# Patient Record
Sex: MALE | Race: WHITE | Employment: FULL TIME | URBAN - METROPOLITAN AREA
[De-identification: names, ages, dates, MRNs, and addresses within clinical notes are randomized per-mention and may not be internally consistent; named-entity substitution may affect disease eponyms.]

---

## 2017-03-10 ENCOUNTER — OFFICE VISIT (OUTPATIENT)
Dept: FAMILY MEDICINE | Facility: CLINIC | Age: 57
End: 2017-03-10
Payer: COMMERCIAL

## 2017-03-10 VITALS
HEIGHT: 76 IN | TEMPERATURE: 96.4 F | SYSTOLIC BLOOD PRESSURE: 99 MMHG | DIASTOLIC BLOOD PRESSURE: 55 MMHG | BODY MASS INDEX: 22.16 KG/M2 | WEIGHT: 182 LBS | HEART RATE: 79 BPM

## 2017-03-10 DIAGNOSIS — H90.8 MIXED HEARING LOSS: ICD-10-CM

## 2017-03-10 DIAGNOSIS — M62.40 CONTRACTED, TENDON: Primary | ICD-10-CM

## 2017-03-10 PROCEDURE — 99213 OFFICE O/P EST LOW 20 MIN: CPT | Performed by: FAMILY MEDICINE

## 2017-03-10 NOTE — MR AVS SNAPSHOT
After Visit Summary   3/10/2017    Rhett Valentin    MRN: [id number]           Patient Information     Date Of Birth          1960        Visit Information        Provider Department      3/10/2017 6:40 AM Charles Leija MD [facility]        Today's Diagnoses     Contracted, tendon    -  1      Care Instructions          Thank you for choosing [facility].  You may be receiving a survey in the mail from [employer] regarding your visit today.  Please take a few minutes to complete and return the survey to let us know how we are doing.      If you have questions or concerns, please contact us via uControl or you can contact your care team at [phone].    Our Clinic hours are:  Monday 6:40 am  to 7:00 pm  Tuesday -Friday 6:40 am to 5:00 pm    The [place] outpatient lab hours are:  Monday - Friday 6:10 am to 4:45 pm  Saturdays 7:00 am to 11:00 am  Appointments are required, call [phone]    If you have clinical questions after hours or would like to schedule an appointment,  call the clinic at [phone].        Follow-ups after your visit        Additional Services     ORTHO  REFERRAL       [facility] is referring you to the Orthopedic  Services at [facility].       The  Representative will assist you in the coordination of your Orthopedic and Musculoskeletal Care as prescribed by your physician.    The  Representative will call you within 1 business day to help schedule your appointment, or you may contact the  Representative at:    All areas ~ [phone]     Type of Referral : Surgical / Specialist ( hand surgeon )      Timeframe requested: Routine    Coverage of these services is subject to the terms and limitations of your health insurance plan.  Please call member services at your health plan with any benefit or coverage questions.      If X-rays, CT or MRI's have  "been performed, please contact the facility where they were done to arrange for , prior to your scheduled appointment.  Please bring this referral request to your appointment and present it to your specialist.                  Who to contact     If you have questions or need follow up information about today's clinic visit or your schedule please contact [facility] directly at [phone].  Normal or non-critical lab and imaging results will be communicated to you by MyChart, letter or phone within 4 business days after the clinic has received the results. If you do not hear from us within 7 days, please contact the clinic through [employer] or phone. If you have a critical or abnormal lab result, we will notify you by phone as soon as possible.  Submit refill requests through uberlife or call your pharmacy and they will forward the refill request to us. Please allow 3 business days for your refill to be completed.          Additional Information About Your Visit        uberlife Information     uberlife lets you send messages to your doctor, view your test results, renew your prescriptions, schedule appointments and more. To sign up, go to [email or web address] . Click on \"Log in\" on the left side of the screen, which will take you to the Welcome page. Then click on \"Sign up Now\" on the right side of the page.     You will be asked to enter the access code listed below, as well as some personal information. Please follow the directions to create your username and password.     Your access code is: [email or web address]  Expires: 2017  7:29 AM     Your access code will  in 90 days. If you need help or a new code, please call your [facility] or [phone].        Care EveryWhere ID     This is your Care EveryWhere ID. This could be used by other organizations to access your [place] medical records  [id number]        Your Vitals Were     Pulse Temperature Height BMI (Body Mass Index)       " "   79 96.4  F (35.8  C) (Tympanic) 6' 4\" (1.93 m) 22.15 kg/m2         Blood Pressure from Last 3 Encounters:   03/10/17 99/55   10/27/15 116/72   10/25/15 117/83    Weight from Last 3 Encounters:   03/10/17 182 lb (82.6 kg)   10/27/15 180 lb 3.2 oz (81.7 kg)   10/14/14 182 lb (82.6 kg)              We Performed the Following     ORTHO  REFERRAL        Primary Care Provider    Physician No Ref-Primary       No address on file        Thank you!     Thank you for choosing [facility]  for your care. Our goal is always to provide you with excellent care. Hearing back from our patients is one way we can continue to improve our services. Please take a few minutes to complete the written survey that you may receive in the mail after your visit with us. Thank you!             Your Updated Medication List - Protect others around you: Learn how to safely use, store and throw away your medicines at www.disposemymeds.org.          This list is accurate as of: 3/10/17  7:29 AM.  Always use your most recent med list.                   Brand Name Dispense Instructions for use    amoxicillin-clavulanate 875-125 MG per tablet    AUGMENTIN    20 tablet    Take 1 tablet by mouth 2 times daily         "

## 2017-03-10 NOTE — PATIENT INSTRUCTIONS
Thank you for choosing [facility].  You may be receiving a survey in the mail from Dylan Shelton regarding your visit today.  Please take a few minutes to complete and return the survey to let us know how we are doing.      If you have questions or concerns, please contact us via MtoV or you can contact your care team at [phone].    Our Clinic hours are:  Monday 6:40 am  to 7:00 pm  Tuesday -Friday 6:40 am to 5:00 pm    The [place] outpatient lab hours are:  Monday - Friday 6:10 am to 4:45 pm  Saturdays 7:00 am to 11:00 am  Appointments are required, call [phone]    If you have clinical questions after hours or would like to schedule an appointment,  call the clinic at [phone].

## 2017-03-10 NOTE — PROGRESS NOTES
SUBJECTIVE:                                                    Rhett Valentin is a 56 year old male who presents to clinic today for the following health issues:      Concern - R hand      Onset: 10yrs ago     Description:   Patient has a pulled tendon R hand     Intensity: moderate    Progression of Symptoms:  worsening    Accompanying Signs & Symptoms:  Started smaller        Previous history of similar problem:   no    Precipitating factors:   Worsened by: bumping his finger he has some pain more annoying than anything else    Alleviating factors:  Improved by: none        Therapies Tried and outcome: none      Concern - Patient would like hearing test /has ringing in his ears      Onset: longer than 1yr ago     Description:   Patient has a hard time hearing and has ringing in ear     Intensity: moderate, severe has a hard time hearing     Progression of Symptoms:  worsening    Accompanying Signs & Symptoms:  Did not wear hearing protection when he should         Previous history of similar problem:   no    Precipitating factors:   Worsened by: ringing     Alleviating factors:  Improved by: none        Therapies Tried and outcome: none     Hearing test completed   R ear 500/40 DBHL  L ear 500/20 DBHL 1000-500/25 DBHL 1000-500/40DBHL    Patient is a 56 yr old male here for hand pain. He has had this for many years and he has a tenon on the palm of his hand that is contracted. He will like this looked at by a specialist. He also reports hearing loss and ringing in his ear.   Patient reports that he has had some hearing evaluation in the past and he has been told that he has some hearing loss. He has not be told that he will need hearing aids.   His ears also feel plugged at the moment.      Problem list and histories reviewed & adjusted, as indicated.  Additional history: as documented    Patient Active Problem List   Diagnosis     Tobacco use disorder     Past Surgical History   Procedure Laterality Date      "Orthopedic surgery       left arm       Social History   Substance Use Topics     Smoking status: Current Every Day Smoker     Packs/day: 0.50     Smokeless tobacco: Former User     Alcohol use Yes      Comment: 1 beer per night     History reviewed. No pertinent family history.      Current Outpatient Prescriptions   Medication Sig Dispense Refill     amoxicillin-clavulanate (AUGMENTIN) 875-125 MG per tablet Take 1 tablet by mouth 2 times daily (Patient not taking: Reported on 3/10/2017) 20 tablet 0     No Known Allergies    Reviewed and updated as needed this visit by clinical staff  Tobacco  Allergies  Med Hx  Surg Hx  Fam Hx  Soc Hx      Reviewed and updated as needed this visit by Provider         ROS:  Constitutional, HEENT, cardiovascular, pulmonary, gi and gu systems are negative, except as otherwise noted.    OBJECTIVE:                                                    BP 99/55 (BP Location: Left arm, Cuff Size: Adult Regular)  Pulse 79  Temp 96.4  F (35.8  C) (Tympanic)  Ht 6' 4\" (1.93 m)  Wt 182 lb (82.6 kg)  BMI 22.15 kg/m2  Body mass index is 22.15 kg/(m^2).  GENERAL: healthy, alert and no distress  HENT: normal cephalic/atraumatic, right ear: occluded with wax, left ear: normal: no effusions, no erythema, normal landmarks, nose and mouth without ulcers or lesions, oropharynx clear and oral mucous membranes moist  NECK: no adenopathy, no asymmetry, masses, or scars and thyroid normal to palpation  RESP: lungs clear to auscultation - no rales, rhonchi or wheezes  CV: regular rate and rhythm, normal S1 S2, no S3 or S4, no murmur, click or rub, no peripheral edema and peripheral pulses strong  ABDOMEN: soft, nontender, no hepatosplenomegaly, no masses and bowel sounds normal  MS: decreased range of motion of middle finger on right hand and tendon contracture in palm of right hand.     Diagnostic Test Results:  none      ASSESSMENT/PLAN:                                                    1. " Contracted, tendon  Patient referred to hand surgeon   - ORTHO  REFERRAL    2. Mixed hearing loss  Patient referred for hearing evaluation. Ears irrigated today. No infection.  - OTOLARYNGOLOGY REFERRAL    FUTURE APPOINTMENTS:       - Follow-up visit as needed.    Charles Leija MD  [facility]

## 2017-04-26 ENCOUNTER — TELEPHONE (OUTPATIENT)
Dept: FAMILY MEDICINE | Facility: CLINIC | Age: 57
End: 2017-04-26

## 2017-04-26 DIAGNOSIS — Z12.11 COLON CANCER SCREENING: Primary | ICD-10-CM

## 2017-05-11 NOTE — TELEPHONE ENCOUNTER
Mailed patient panel management letter detailing colonoscopy screening or FIT test options.  He has been contacted in the past twice by telephone without returning our phone calls.      Panel Management Review      Patient has the following on his problem list: None      Composite cancer screening  Chart review shows that this patient is due/due soon for the following Colonoscopy and Fecal Colorectal (FIT)  Summary:    Patient is due/failing the following:   COLONOSCOPY and FIT    Action needed:   Patient needs referral/order: for either FIT test or colonoscopy order.    Type of outreach:    Phone, left message for patient to call back.  and Sent letter.    Questions for provider review:    PLEASE PLACE ORDERS FOR EITHER A FIT TEST OR COLONOSCOPY ORDER.                                                                                                                                    Dana Mina CMA ([facility]) 8:49 AM 5/11/2017         Chart routed to Provider .

## 2017-05-16 NOTE — TELEPHONE ENCOUNTER
Will wait for patient to return call to state which Colon cancer screening test he wants to do. Ryan ORDOÑEZ CMA  Patient did not call back or indicate this yet.

## 2018-09-18 ENCOUNTER — TRANSFERRED RECORDS (OUTPATIENT)
Dept: HEALTH INFORMATION MANAGEMENT | Facility: CLINIC | Age: 58
End: 2018-09-18

## 2018-09-19 ENCOUNTER — TRANSFERRED RECORDS (OUTPATIENT)
Dept: HEALTH INFORMATION MANAGEMENT | Facility: CLINIC | Age: 58
End: 2018-09-19

## 2018-10-16 ENCOUNTER — TRANSFERRED RECORDS (OUTPATIENT)
Dept: HEALTH INFORMATION MANAGEMENT | Facility: CLINIC | Age: 58
End: 2018-10-16

## 2019-08-26 ENCOUNTER — OFFICE VISIT (OUTPATIENT)
Dept: ORTHOPEDICS | Facility: CLINIC | Age: 59
End: 2019-08-26
Payer: COMMERCIAL

## 2019-08-26 ENCOUNTER — ANCILLARY PROCEDURE (OUTPATIENT)
Dept: GENERAL RADIOLOGY | Facility: CLINIC | Age: 59
End: 2019-08-26
Attending: FAMILY MEDICINE
Payer: COMMERCIAL

## 2019-08-26 VITALS — SYSTOLIC BLOOD PRESSURE: 110 MMHG | DIASTOLIC BLOOD PRESSURE: 60 MMHG

## 2019-08-26 DIAGNOSIS — M79.671 RIGHT FOOT PAIN: ICD-10-CM

## 2019-08-26 DIAGNOSIS — S92.501A CLOSED FRACTURE OF PHALANX OF RIGHT THIRD TOE, INITIAL ENCOUNTER: Primary | ICD-10-CM

## 2019-08-26 PROCEDURE — 99203 OFFICE O/P NEW LOW 30 MIN: CPT | Performed by: FAMILY MEDICINE

## 2019-08-26 PROCEDURE — 73630 X-RAY EXAM OF FOOT: CPT | Mod: RT

## 2019-08-26 NOTE — LETTER
8/26/2019         RE: Rhett Valentin  [address] [place] [address]        Dear Colleague,    Thank you for referring your patient, Rhett Valentin, to the [facility] [place]. Please see a copy of my visit note below.    ASSESSMENT & PLAN  Rhett was seen today for pain.    Diagnoses and all orders for this visit:    Right foot pain  -     XR Foot RT G/E 3 vw; Future      Patient is a 59 year old male presenting for  evaluation of   Chief Complaint   Patient presents with     Right Foot - Pain      # Mildly Displaced Right Prox 3rd Toe Fracture: Notable after injury 3 weeks ago with toe still swollen and tender on exam.  XR showing evidence of healing but mildly displaced prox 3rd toe fracture.  Counseled patient on nature of condition and treatment options.  Plan as below f/u PRN.  Letter written for work can restart 9/3/19 f/u if pain not improved over the next 3-4 weeks  Treatment: stiff soled shoes, relative rest  Physical Therapy none  Injection none  Medications  Limited NSAIDs/Tylenol    Concerning signs/sx that would warrant urgent evaluation were discussed.  All questions were answered, patient understands and agrees with plan.      Return if symptoms worsen or fail to improve.      -----    SUBJECTIVE  Rhett Valentin is a/an 59 year old male who is seen as a self referral for evaluation of right foot pain. The patient is seen by themselves.    Onset: 3 week(s) ago. Patient describes injury as getting out of bed and kicked the side board of the bed  Works: Construction builds elevators   Location of Pain: right foot   Rating of Pain at worst: 10/10  Rating of Pain Currently: 5/10  Worsened by: any movement  Better with: elevation   Treatments tried: elevation  Associated symptoms: swelling  Orthopedic history: YES -20+ years ago foot fx   Relevant surgical history: NO  History reviewed. No pertinent past medical history.  Social History     Socioeconomic History      Marital status: Single     Spouse name: Not on file     Number of children: Not on file     Years of education: Not on file     Highest education level: Not on file   Occupational History     Not on file   Social Needs     Financial resource strain: Not on file     Food insecurity:     Worry: Not on file     Inability: Not on file     Transportation needs:     Medical: Not on file     Non-medical: Not on file   Tobacco Use     Smoking status: Current Every Day Smoker     Packs/day: 0.50     Smokeless tobacco: Former User   Substance and Sexual Activity     Alcohol use: Yes     Comment: 1 beer per night     Drug use: No     Comment: quit 30 yrs ago     Sexual activity: Not on file   Lifestyle     Physical activity:     Days per week: Not on file     Minutes per session: Not on file     Stress: Not on file   Relationships     Social connections:     Talks on phone: Not on file     Gets together: Not on file     Attends Tenriism service: Not on file     Active member of club or organization: Not on file     Attends meetings of clubs or organizations: Not on file     Relationship status: Not on file     Intimate partner violence:     Fear of current or ex partner: Not on file     Emotionally abused: Not on file     Physically abused: Not on file     Forced sexual activity: Not on file   Other Topics Concern     Parent/sibling w/ CABG, MI or angioplasty before 65F 55M? Not Asked   Social History Narrative     Not on file         Patient's past medical, surgical, social, and family histories were reviewed today and no changes are noted.  No family history pertinent to the patient's problem today    REVIEW OF SYSTEMS:  10 point ROS is negative other than symptoms noted above in HPI, Past Medical History or as stated below  Constitutional: NEGATIVE for fever, chills, change in weight  Skin: NEGATIVE for worrisome rashes, moles or lesions  GI/: NEGATIVE for bowel or bladder changes  Neuro: NEGATIVE for weakness, dizziness  or paresthesias    OBJECTIVE:  /60    General: healthy, alert and in no distress  HEENT: no scleral icterus or conjunctival erythema  Skin: no suspicious lesions or rash. No jaundice.  CV:  no pedal edema  Resp: normal respiratory effort without conversational dyspnea   Psych: normal mood and affect  Gait: normal steady gait with appropriate coordination and balance  Neuro: Normal light sensory exam of lower extremity  MSK:  RIGHT FOOT  Inspection:    Sig swelling over 3rd phalanx  Palpation:    Tender about the 3rd prox phalanx. Otherwise remainder of bony/ligamentous landmarks are non-tender.  Range of Motion:     Grossly intact and non-painful  Strength:    Grossly intact in all planes  Special Tests:    Positive: none    Negative: anterior drawer, heel strike, calcaneal squeeze, Tinel's (tarsal tunnel), Tinel's (webspace), MTP stress and Lisfranc joint tenderness    RIGHT ANKLE  Inspection:    No swelling or ecchymosis is observed  Palpation:  Nontender.  Range of Motion:     Plantarflexion full / dorsiflexion full / inversion full / eversion full  Strength:    full  Special Tests:    negative anterior drawer, negative talar tilt, negative valgus stress, negative forced external rotation/eversion, negative El sign, negative squeeze test. Unable to perform heel raise and Unable to hop.    Independent visualization of the below image:  No results found for this or any previous visit (from the past 24 hour(s)).  FOOT THREE VIEWS RIGHT  8/26/2019 11:06 AM      HISTORY: Right foot pain     COMPARISON: None                                                                    IMPRESSION:  1. Subacute transverse fracture through neck of the third proximal  phalanx with minimal callus. This is minimally displaced on oblique  view, near anatomic alignment on frontal projection.  2. No other acute abnormality or significant degenerative change.     AUTUMN NAILS MD    I  ordered, visualized and reviewed these  images with the patient  Mildly medially displaced prox 3rd phalanx fx    Patient's conditions were thoroughly discussed during today's visit with greater than 50% of the visit spent counseling the patient with total time spent face-to-face with the patient being 30 minutes.    Don Chung MD, [facility]        Again, thank you for allowing me to participate in the care of your patient.        Sincerely,        Don Chung MD

## 2019-08-26 NOTE — PROGRESS NOTES
ASSESSMENT & PLAN  Rhett was seen today for pain.    Diagnoses and all orders for this visit:    Right foot pain  -     XR Foot RT G/E 3 vw; Future      Patient is a 59 year old male presenting for  evaluation of   Chief Complaint   Patient presents with     Right Foot - Pain      # Mildly Displaced Right Prox 3rd Toe Fracture: Notable after injury 3 weeks ago with toe still swollen and tender on exam.  XR showing evidence of healing but mildly displaced prox 3rd toe fracture.  Counseled patient on nature of condition and treatment options.  Plan as below f/u PRN.  Letter written for work can restart 9/3/19 f/u if pain not improved over the next 3-4 weeks  Treatment: stiff soled shoes, relative rest  Physical Therapy none  Injection none  Medications  Limited NSAIDs/Tylenol    Concerning signs/sx that would warrant urgent evaluation were discussed.  All questions were answered, patient understands and agrees with plan.      Return if symptoms worsen or fail to improve.      -----    SUBJECTIVE  Rhett Valentin is a/an 59 year old male who is seen as a self referral for evaluation of right foot pain. The patient is seen by themselves.    Onset: 3 week(s) ago. Patient describes injury as getting out of bed and kicked the side board of the bed  Works: Construction builds elevators   Location of Pain: right foot   Rating of Pain at worst: 10/10  Rating of Pain Currently: 5/10  Worsened by: any movement  Better with: elevation   Treatments tried: elevation  Associated symptoms: swelling  Orthopedic history: YES -20+ years ago foot fx   Relevant surgical history: NO  History reviewed. No pertinent past medical history.  Social History     Socioeconomic History     Marital status: Single     Spouse name: Not on file     Number of children: Not on file     Years of education: Not on file     Highest education level: Not on file   Occupational History     Not on file   Social Needs     Financial resource strain: Not on file      Food insecurity:     Worry: Not on file     Inability: Not on file     Transportation needs:     Medical: Not on file     Non-medical: Not on file   Tobacco Use     Smoking status: Current Every Day Smoker     Packs/day: 0.50     Smokeless tobacco: Former User   Substance and Sexual Activity     Alcohol use: Yes     Comment: 1 beer per night     Drug use: No     Comment: quit 30 yrs ago     Sexual activity: Not on file   Lifestyle     Physical activity:     Days per week: Not on file     Minutes per session: Not on file     Stress: Not on file   Relationships     Social connections:     Talks on phone: Not on file     Gets together: Not on file     Attends Zoroastrian service: Not on file     Active member of club or organization: Not on file     Attends meetings of clubs or organizations: Not on file     Relationship status: Not on file     Intimate partner violence:     Fear of current or ex partner: Not on file     Emotionally abused: Not on file     Physically abused: Not on file     Forced sexual activity: Not on file   Other Topics Concern     Parent/sibling w/ CABG, MI or angioplasty before 65F 55M? Not Asked   Social History Narrative     Not on file         Patient's past medical, surgical, social, and family histories were reviewed today and no changes are noted.  No family history pertinent to the patient's problem today    REVIEW OF SYSTEMS:  10 point ROS is negative other than symptoms noted above in HPI, Past Medical History or as stated below  Constitutional: NEGATIVE for fever, chills, change in weight  Skin: NEGATIVE for worrisome rashes, moles or lesions  GI/: NEGATIVE for bowel or bladder changes  Neuro: NEGATIVE for weakness, dizziness or paresthesias    OBJECTIVE:  /60    General: healthy, alert and in no distress  HEENT: no scleral icterus or conjunctival erythema  Skin: no suspicious lesions or rash. No jaundice.  CV:  no pedal edema  Resp: normal respiratory effort without  conversational dyspnea   Psych: normal mood and affect  Gait: normal steady gait with appropriate coordination and balance  Neuro: Normal light sensory exam of lower extremity  MSK:  RIGHT FOOT  Inspection:    Sig swelling over 3rd phalanx  Palpation:    Tender about the 3rd prox phalanx. Otherwise remainder of bony/ligamentous landmarks are non-tender.  Range of Motion:     Grossly intact and non-painful  Strength:    Grossly intact in all planes  Special Tests:    Positive: none    Negative: anterior drawer, heel strike, calcaneal squeeze, Tinel's (tarsal tunnel), Tinel's (webspace), MTP stress and Lisfranc joint tenderness    RIGHT ANKLE  Inspection:    No swelling or ecchymosis is observed  Palpation:  Nontender.  Range of Motion:     Plantarflexion full / dorsiflexion full / inversion full / eversion full  Strength:    full  Special Tests:    negative anterior drawer, negative talar tilt, negative valgus stress, negative forced external rotation/eversion, negative El sign, negative squeeze test. Unable to perform heel raise and Unable to hop.    Independent visualization of the below image:  No results found for this or any previous visit (from the past 24 hour(s)).  FOOT THREE VIEWS RIGHT  8/26/2019 11:06 AM      HISTORY: Right foot pain     COMPARISON: None                                                                    IMPRESSION:  1. Subacute transverse fracture through neck of the third proximal  phalanx with minimal callus. This is minimally displaced on oblique  view, near anatomic alignment on frontal projection.  2. No other acute abnormality or significant degenerative change.     AUTUMN NAILS MD    I  ordered, visualized and reviewed these images with the patient  Mildly medially displaced prox 3rd phalanx fx    Patient's conditions were thoroughly discussed during today's visit with greater than 50% of the visit spent counseling the patient with total time spent face-to-face with the  patient being 30 minutes.    Don Chung MD, [facility]

## 2019-08-26 NOTE — PATIENT INSTRUCTIONS
Diagnosis: Right third toe fracture  Image Findings: mildly displaced third toe fracture  Treatment: Stiff sole shoe, buddy tape  Medications: Limited tylenol/ibuprofen for pain for 1-2 weeks  Follow-up: As needed if not improved over the next 3-4 weeks    It was great seeing you today!    Don Chung        Patient Education     Closed Toe Fracture  Your toe is broken (fractured). This causes local pain, swelling, and sometimes bruising. This injury usually takes about 4 to 6 weeks to heal, but can sometimes take longer. Toe injuries are often treated by taping the injured toe to the next one (buddy taping). This protects the injured toe and holds it in position.     If the toenail has been severely injured, it may fall off in 1 to 2 weeks. It takes up to 12 months for a new toenail to grow back.  Home care  Follow these guidelines when caring for yourself at home:    You may be given a cast shoe to wear to keep your toe from moving. If not, you can use a sandal or any shoe that doesn t put pressure on the injured toe until the swelling and pain go away. If using a sandal, be careful not to strike your foot against anything. Another injury could make the fracture worse. If you were given crutches, don t put full weight on the injured foot until you can do so without pain, or as directed by your healthcare provider.    Keep your foot elevated to reduce pain and swelling. When sleeping, put a pillow under the injured leg. When sitting, support the injured leg so it is above your waist. This is very important during the first 2 days (48 hours).    Put an ice pack on the injured area. Do this for 20 minutes every 1 to 2 hours the first day for pain relief. You can make an ice pack by wrapping a plastic bag of ice cubes in a thin towel. As the ice melts, be careful that any cloth or paper tape doesn t get wet. Continue using the ice pack 3 to 4 times a day for the next 2 days. Then use the ice pack as needed to  ease pain and swelling.    If brandt tape was used and it becomes wet or dirty, change it. You may replace it with paper, plastic, or cloth tape. Cloth tape and paper tapes must be kept dry.    You may use acetaminophen or ibuprofen to control pain, unless another pain medicine was prescribed. If you have chronic liver or kidney disease, talk with your healthcare provider before using these medicines. Also talk with your provider if you ve had a stomach ulcer or gastrointestinal bleeding.    You may return to sports or physical education activities after 4 weeks when you can run without pain, or as directed by your healthcare provider.  Follow-up care  Follow up with your healthcare provider in 1 week, or as advised. This is to make sure the bone is healing the way it should.  X-rays may be taken. You will be told of any new findings that may affect your care.  When to seek medical advice  Call your healthcare provider right away if any of these occur:    Pain or swelling gets worse    The cast/splint cracks    The cast and padding get wet and stays wet more than 24 hours    Bad odor from the cast/splint or wound fluid stains the cast    Tightness or pressure under the cast/splint gets worse    Toe becomes cold, blue, numb, or tingly    You can t move the toe    Signs of infection: fever, redness, warmth, swelling, or drainage from the wound or cast    Fever of 100.4 F (38 C) or higher, or as directed by your healthcare provider  Date Last Reviewed: 2/1/2017 2000-2018 [employer]. [address] [place] [address], [place] [address]. All rights reserved. This information is not intended as a substitute for professional medical care. Always follow your healthcare professional's instructions.

## 2019-08-26 NOTE — LETTER
[facility]  [address] [facility] [address] [address]  [place]  [address]  [phone]          2019    Rhett Valentin  [address] [place] [id number] [phone] (home)     :     1960      To Whom it May Concern:    This patient was seen 2019 for right toe fracture.  Please excuse him from work.  He can return to work without restrictions starting 9/3/19.     Please contact me for questions or concerns.    Sincerely,    Don Chung MD

## 2019-11-30 ENCOUNTER — HOSPITAL ENCOUNTER (EMERGENCY)
Facility: CLINIC | Age: 59
Discharge: HOME | End: 2019-11-30
Attending: NURSE PRACTITIONER | Admitting: NURSE PRACTITIONER
Payer: COMMERCIAL

## 2019-11-30 VITALS
SYSTOLIC BLOOD PRESSURE: 109 MMHG | RESPIRATION RATE: 18 BRPM | OXYGEN SATURATION: 97 % | TEMPERATURE: 97.9 F | HEIGHT: 76 IN | WEIGHT: 180 LBS | DIASTOLIC BLOOD PRESSURE: 68 MMHG | BODY MASS INDEX: 21.92 KG/M2

## 2019-11-30 DIAGNOSIS — J20.9 ACUTE BRONCHITIS WITH SYMPTOMS > 10 DAYS: ICD-10-CM

## 2019-11-30 PROCEDURE — 99214 OFFICE O/P EST MOD 30 MIN: CPT | Mod: Z6 | Performed by: NURSE PRACTITIONER

## 2019-11-30 PROCEDURE — G0463 HOSPITAL OUTPT CLINIC VISIT: HCPCS | Performed by: NURSE PRACTITIONER

## 2019-11-30 RX ORDER — AZITHROMYCIN 250 MG/1
TABLET, FILM COATED ORAL
Qty: 6 TABLET | Refills: 0 | Status: SHIPPED | OUTPATIENT
Start: 2019-11-30 | End: 2019-12-05

## 2019-11-30 ASSESSMENT — ENCOUNTER SYMPTOMS
SHORTNESS OF BREATH: 0
LIGHT-HEADEDNESS: 0
FATIGUE: 1
SORE THROAT: 0
DIARRHEA: 0
HEADACHES: 0
VOMITING: 0
COUGH: 1
MYALGIAS: 0
FEVER: 0
DIZZINESS: 0
WHEEZING: 0

## 2019-11-30 ASSESSMENT — MIFFLIN-ST. JEOR: SCORE: 1742.97

## 2019-11-30 NOTE — DISCHARGE INSTRUCTIONS
Z-suraj per package instructions.  Drink plenty of fluids.  Tylenol or ibuprofen if needed.  Rest.  Recheck for fevers, chest pain, shortness of breath, or worsening symptoms.

## 2019-11-30 NOTE — ED AVS SNAPSHOT
[facility] Emergency Department  [address] [address]  Phone:  [phone]  Fax:  [phone]                                    Rhett Valentin   MRN: [id number]    Department:  [facility] Emergency Department   Date of Visit:  11/30/2019           After Visit Summary Signature Page    I have received my discharge instructions, and my questions have been answered. I have discussed any challenges I see with this plan with the nurse or doctor.    ..........................................................................................................................................  Patient/Patient Representative Signature      ..........................................................................................................................................  Patient Representative Print Name and Relationship to Patient    ..................................................               ................................................  Date                                   Time    ..........................................................................................................................................  Reviewed by Signature/Title    ...................................................              ..............................................  Date                                               Time          22EPIC Rev 08/18

## 2019-11-30 NOTE — ED PROVIDER NOTES
"  History     Chief Complaint   Patient presents with     URI     HPI  Rhett Valentin is a 59 year old male who presents urgent care for evaluation of cough.  Symptoms started 2 weeks ago.  Cough is productive.  Worse over the last 3 days.  No fevers.  No chest pain or shortness of breath.  Patient is a current everyday smoker.    Allergies:  No Known Allergies    Problem List:    Patient Active Problem List    Diagnosis Date Noted     Tobacco use disorder 10/27/2015     Priority: Medium        Past Medical History:    History reviewed. No pertinent past medical history.    Past Surgical History:    Past Surgical History:   Procedure Laterality Date     ORTHOPEDIC SURGERY      left arm       Family History:    No family history on file.    Social History:  Marital Status:  Single [1]  Social History     Tobacco Use     Smoking status: Current Every Day Smoker     Packs/day: 0.50     Smokeless tobacco: Former User   Substance Use Topics     Alcohol use: Yes     Comment: 1 beer per night     Drug use: No     Comment: quit 30 yrs ago        Medications:    azithromycin (ZITHROMAX Z-ARTHUR) 250 MG tablet  amoxicillin-clavulanate (AUGMENTIN) 875-125 MG per tablet          Review of Systems   Constitutional: Positive for fatigue. Negative for fever.   HENT: Positive for congestion. Negative for ear pain and sore throat.    Respiratory: Positive for cough. Negative for shortness of breath and wheezing.    Cardiovascular: Negative for chest pain.   Gastrointestinal: Negative for diarrhea and vomiting.   Musculoskeletal: Negative for myalgias.   Neurological: Negative for dizziness, light-headedness and headaches.       Physical Exam   BP: 109/68  Heart Rate: 95  Temp: 97.9  F (36.6  C)  Resp: 18  Height: 193 cm (6' 4\")  Weight: 81.6 kg (180 lb)  SpO2: 97 %      Physical Exam    GENERAL APPEARANCE: alert and oriented. NAD.   EYES: conjunctiva clear  HENT: bilateral ear canals clear, intact, and without inflammation. Right TM " normal. Left TM normal. Nose normal.  Oropharynx without ulcers, erythema or lesions  NECK: supple, nontender, no lymphadenopathy  RESP: lungs clear to auscultation - no rales, rhonchi or wheezes  CV: regular rates and rhythm, normal S1 S2, no murmur noted      ED Course        Procedures               No results found for this or any previous visit (from the past 24 hour(s)).    Medications - No data to display    Assessments & Plan (with Medical Decision Making)   History and exam consistent with acute bronchitis. No fever, no tachypnea, lung sounds CTA- I have low suspicion for pneumonia. Given his prolonged course of illness with worsening and history of current every day smoker patient will be treated with antibiotics for bacterial pathogen. Provided Rx for Z-arthur. Worrisome reasons to recheck discussed.    I have reviewed the nursing notes.    I have reviewed the findings, diagnosis, plan and need for follow up with the patient.      Discharge Medication List as of 11/30/2019  1:41 PM      START taking these medications    Details   azithromycin (ZITHROMAX Z-ARTHUR) 250 MG tablet Two tablets on the first day, then one tablet daily for the next 4 days, Disp-6 tablet, R-0, E-Prescribe             Final diagnoses:   Acute bronchitis with symptoms > 10 days       11/30/2019   [facility] EMERGENCY DEPARTMENT     Claudette Ernandez APRN CNP  11/30/19 [id number]

## 2020-09-09 NOTE — PROGRESS NOTES
Subjective     Rhett Valentin is a 60 year old male who presents to clinic today for the following health issues:    HPI   Patient is a 60-year-old male who presents today with right ankle pain that has been ongoing for about 8 months. He said on the work, he had a metal steel hit the medial side of his leg/ankle in January.   Since then he has had some tingling and numbness and pain which radiates into his big toe and his second toe.      He reports that this is more of an annoyance and there is no severe pain. He has not had any issues with mobility.Denies any swelling or redness.  He was not evaluated at the time of the injury.      Ankle Pain    Symptoms starting Jan 2020. Had a piece of steel hit his medial right ankle. Caused an abrasion, slight pain that caused him to limp (felt no need to be avaluated).    Now has burning pain from Right medial ankle to great toe, 2nd,  and 3rd toes.   Numbness tingling, swelling/heavy sensation.     Sensation comes and goes during the day, always present at night when going to bed. Aggravated by walking on concrete.    Has tried no home treatment to date.    Rates pain at  1/10 while at rest, 3/10 at worst.      Also states that he felt a pop in left elbow while lifting heavy item at work some time ago, pain also in left shoulder.            Review of Systems   Constitutional, HEENT, cardiovascular, pulmonary, gi and gu systems are negative, except as otherwise noted.      Objective    /62   Pulse 80   Temp 97.5  F (36.4  C) (Tympanic)   Resp 12   Wt 79.2 kg (174 lb 9.6 oz)   SpO2 98%   BMI 21.25 kg/m    Body mass index is 21.25 kg/m .  Physical Exam  Constitutional:       Appearance: He is normal weight.   HENT:      Head: Normocephalic and atraumatic.      Nose: Nose normal.   Eyes:      Pupils: Pupils are equal, round, and reactive to light.   Musculoskeletal:         General: Tenderness and signs of injury present.        Feet:    Feet:      Comments: Area  superior to the medial malleolus - point tenderness likely a tendon   Skin:     General: Skin is warm and dry.   Neurological:      Mental Status: He is alert and oriented to person, place, and time.   Psychiatric:         Mood and Affect: Mood normal.         Behavior: Behavior normal.       No results found for this or any previous visit (from the past 24 hour(s)).        Assessment & Plan     Ankle injury, right, initial encounter  Likely a tendonitis- recommended a boot patient declined. Referral to podiatrist .   - Orthopedic & Spine  Referral; Future           FUTURE APPOINTMENTS:       - Follow-up visit in one month or sooner as needed.    Return in about 4 weeks (around 10/8/2020) for In-Clinic Visit.    Charles Leija MD  [facility]

## 2020-09-10 ENCOUNTER — OFFICE VISIT (OUTPATIENT)
Dept: FAMILY MEDICINE | Facility: CLINIC | Age: 60
End: 2020-09-10
Payer: COMMERCIAL

## 2020-09-10 VITALS
BODY MASS INDEX: 21.25 KG/M2 | RESPIRATION RATE: 12 BRPM | DIASTOLIC BLOOD PRESSURE: 62 MMHG | TEMPERATURE: 97.5 F | SYSTOLIC BLOOD PRESSURE: 106 MMHG | WEIGHT: 174.6 LBS | OXYGEN SATURATION: 98 % | HEART RATE: 80 BPM

## 2020-09-10 DIAGNOSIS — S99.911A ANKLE INJURY, RIGHT, INITIAL ENCOUNTER: Primary | ICD-10-CM

## 2020-09-10 PROCEDURE — 99213 OFFICE O/P EST LOW 20 MIN: CPT | Performed by: FAMILY MEDICINE

## 2020-09-10 NOTE — NURSING NOTE
"Initial /62   Pulse 80   Temp 97.5  F (36.4  C) (Tympanic)   Resp 12   Wt 79.2 kg (174 lb 9.6 oz)   SpO2 98%   BMI 21.25 kg/m   Estimated body mass index is 21.25 kg/m  as calculated from the following:    Height as of 11/30/19: 1.93 m (6' 4\").    Weight as of this encounter: 79.2 kg (174 lb 9.6 oz). .      "

## 2021-03-16 ENCOUNTER — OFFICE VISIT (OUTPATIENT)
Dept: FAMILY MEDICINE | Facility: CLINIC | Age: 61
End: 2021-03-16
Payer: COMMERCIAL

## 2021-03-16 VITALS
OXYGEN SATURATION: 98 % | SYSTOLIC BLOOD PRESSURE: 118 MMHG | WEIGHT: 179 LBS | DIASTOLIC BLOOD PRESSURE: 76 MMHG | RESPIRATION RATE: 16 BRPM | HEART RATE: 100 BPM | TEMPERATURE: 97 F | HEIGHT: 76 IN | BODY MASS INDEX: 21.8 KG/M2

## 2021-03-16 DIAGNOSIS — M25.512 ACUTE PAIN OF LEFT SHOULDER: Primary | ICD-10-CM

## 2021-03-16 PROCEDURE — 99213 OFFICE O/P EST LOW 20 MIN: CPT | Performed by: NURSE PRACTITIONER

## 2021-03-16 ASSESSMENT — MIFFLIN-ST. JEOR: SCORE: 1733.44

## 2021-03-16 NOTE — PROGRESS NOTES
Assessment & Plan     Acute pain of left shoulder.  Exam is negative for red flags.  Patient currently has some impingement at the bicep tendon insertion most likely from overuse.  Cortisone injection is not indicated at this time since he has normal range of motion.  Recommended conservative measures like ROM exercise, ice/heat, reduction in activities that elicit pain and tylenol/ibuprofen for pain.  Follow-up in clinic if any worsening symptoms.    FOLLOW UP PLANS  Returned to clinic if symptoms is not resolve for evaluation.     Arcelia Hendrix NP  [facility] BEVERLY Delaney is a 60 year old who presents for the following health issues.    Musculoskeletal problem/pain  Onset/Duration: 2 - 3 weeks   Description  Location: shoulder  - left  Joint Swelling: no  Redness: no  Pain: YES- 6/10  Warmth: no  Intensity:  moderate  Progression of Symptoms:  same and constant  Accompanying signs and symptoms:   Fevers: no  Numbness/tingling/weakness: YES- sharp, shooting pain   History  Trauma to the area: no  Recent illness:  no  Previous similar problem: YES- torn rotater cuff in right shoulder   Previous evaluation:  no  Precipitating or alleviating factors:  Aggravating factors include: lifting and overuse  Therapies tried and outcome: nothing    Patient reported left Shoulder pain 5/10 that radiate to the thumb intermittently that started 2 or 3 weeks ago and was interfere with sleep. Patient denied using any medication to relieve pain at this moment. Patient is able to do ROM without discomfort.  Pain does not occur with movement. Patient reported having the same pain in his right arm 5-6 years ago with reduction in ROM and has cortisone injection which treated the pain and it did not recur.  He is wondering if he should do the same in this arm.  Patient was swinging an ax this weekend which made it worse.    Review of Systems     CONSTITUTIONAL: No fever, chills, change in  "weigh.  MUSCULOSKELETAL:  Pain located in left upper arm  PSYCHIATRIC:Negative for changes in mood or affect      Objective    /76   Pulse 100   Temp 97  F (36.1  C) (Tympanic)   Resp 16   Ht 1.93 m (6' 4\")   Wt 81.2 kg (179 lb)   SpO2 98%   BMI 21.79 kg/m    Body mass index is 21.79 kg/m .     Physical Exam   GENERAL: healthy, alert and no distress.  MS: no gross musculoskeletal defects noted, no edema or warmth noted;  Patient has pinpoint tenderness at bicep tendon insertion   PSYCH: mentation appears normal, affect normal/bright.            "

## 2021-03-16 NOTE — PATIENT INSTRUCTIONS
1.  Use ice/heat 3-4 times daily for 15-20 minutes.  2.  Use ibuprofen 600 mg up to 4 times daily with food.  3.  Use tylenol in addition as needed for pain.  4.  Do exercises with range of motion with the shoulder.  5.  If it hurts, don't do it.  6.  Follow-up if any worsening symptoms.

## 2021-04-09 ENCOUNTER — OFFICE VISIT (OUTPATIENT)
Dept: DERMATOLOGY | Facility: CLINIC | Age: 61
End: 2021-04-09
Payer: COMMERCIAL

## 2021-04-09 VITALS — HEART RATE: 72 BPM | DIASTOLIC BLOOD PRESSURE: 75 MMHG | OXYGEN SATURATION: 97 % | SYSTOLIC BLOOD PRESSURE: 117 MMHG

## 2021-04-09 DIAGNOSIS — L82.0 INFLAMED SEBORRHEIC KERATOSIS: Primary | ICD-10-CM

## 2021-04-09 PROCEDURE — 17110 DESTRUCTION B9 LES UP TO 14: CPT | Performed by: PHYSICIAN ASSISTANT

## 2021-04-09 PROCEDURE — 99212 OFFICE O/P EST SF 10 MIN: CPT | Mod: 25 | Performed by: PHYSICIAN ASSISTANT

## 2021-04-09 NOTE — PROGRESS NOTES
Rhett Valentin is an extremely pleasant 60 year old year old male patient here today for spot on scalp. Present for year, recently was irritated by his painting during hair cut and developed a scab. No pain or bleeding. Patient has no other skin complaints today.  Remainder of the HPI, Meds, PMH, Allergies, FH, and SH was reviewed in chart.    History reviewed. No pertinent past medical history.    Past Surgical History:   Procedure Laterality Date     ORTHOPEDIC SURGERY      left arm        History reviewed. No pertinent family history.    Social History     Socioeconomic History     Marital status: Single     Spouse name: Not on file     Number of children: Not on file     Years of education: Not on file     Highest education level: Not on file   Occupational History     Not on file   Social Needs     Financial resource strain: Not on file     Food insecurity     Worry: Not on file     Inability: Not on file     Transportation needs     Medical: Not on file     Non-medical: Not on file   Tobacco Use     Smoking status: Current Every Day Smoker     Packs/day: 0.50     Smokeless tobacco: Former User   Substance and Sexual Activity     Alcohol use: Yes     Comment: 1 beer per night     Drug use: No     Comment: quit 30 yrs ago     Sexual activity: Not on file   Lifestyle     Physical activity     Days per week: Not on file     Minutes per session: Not on file     Stress: Not on file   Relationships     Social connections     Talks on phone: Not on file     Gets together: Not on file     Attends Sabianist service: Not on file     Active member of club or organization: Not on file     Attends meetings of clubs or organizations: Not on file     Relationship status: Not on file     Intimate partner violence     Fear of current or ex partner: Not on file     Emotionally abused: Not on file     Physically abused: Not on file     Forced sexual activity: Not on file   Other Topics Concern     Parent/sibling w/ CABG, MI or  angioplasty before 65F 55M? Not Asked   Social History Narrative     Not on file       No outpatient encounter medications on file as of 4/9/2021.     No facility-administered encounter medications on file as of 4/9/2021.              Review Of Systems  Skin: As above  Eyes: negative  Ears/Nose/Throat: negative  Respiratory: No shortness of breath, dyspnea on exertion, cough      O:   NAD, WDWN, Alert & Oriented, Mood & Affect wnl, Vitals stable   Here today alone   /75   Pulse 72   SpO2 97%    General appearance normal   Vitals stable   Alert, oriented and in no acute distress     Brown stuck on papule on left frontal scalp       Eyes: Conjunctivae/lids:Normal     ENT: Lips: normal    MSK:Normal     Pulm: Breathing Normal     Neuro/Psych: Orientation:Alert and Orientedx3 ; Mood/Affect:normal   A/P:  1. Inflamed seborrheic keratosis on left frontal scalp x 1  LN2:  Treated with LN2 for 5s for 1-2 cycles. Warned risks of blistering, pain, pigment change, scarring, and incomplete resolution.  Advised patient to return if lesions do not completely resolve.  Wound care sheet given.

## 2021-04-09 NOTE — LETTER
4/9/2021         RE: Rhett Valentin  [address] [place] [address]        Dear Colleague,    Thank you for referring your patient, Rhett Valentin, to the [facility]. Please see a copy of my visit note below.    Rhett Valentin is an extremely pleasant 60 year old year old male patient here today for spot on scalp. Present for year, recently was irritated by his painting during hair cut and developed a scab. No pain or bleeding. Patient has no other skin complaints today.  Remainder of the HPI, Meds, PMH, Allergies, FH, and SH was reviewed in chart.    History reviewed. No pertinent past medical history.    Past Surgical History:   Procedure Laterality Date     ORTHOPEDIC SURGERY      left arm        History reviewed. No pertinent family history.    Social History     Socioeconomic History     Marital status: Single     Spouse name: Not on file     Number of children: Not on file     Years of education: Not on file     Highest education level: Not on file   Occupational History     Not on file   Social Needs     Financial resource strain: Not on file     Food insecurity     Worry: Not on file     Inability: Not on file     Transportation needs     Medical: Not on file     Non-medical: Not on file   Tobacco Use     Smoking status: Current Every Day Smoker     Packs/day: 0.50     Smokeless tobacco: Former User   Substance and Sexual Activity     Alcohol use: Yes     Comment: 1 beer per night     Drug use: No     Comment: quit 30 yrs ago     Sexual activity: Not on file   Lifestyle     Physical activity     Days per week: Not on file     Minutes per session: Not on file     Stress: Not on file   Relationships     Social connections     Talks on phone: Not on file     Gets together: Not on file     Attends Zoroastrianism service: Not on file     Active member of club or organization: Not on file     Attends meetings of clubs or organizations: Not on file     Relationship status: Not on file      Intimate partner violence     Fear of current or ex partner: Not on file     Emotionally abused: Not on file     Physically abused: Not on file     Forced sexual activity: Not on file   Other Topics Concern     Parent/sibling w/ CABG, MI or angioplasty before 65F 55M? Not Asked   Social History Narrative     Not on file       No outpatient encounter medications on file as of 4/9/2021.     No facility-administered encounter medications on file as of 4/9/2021.              Review Of Systems  Skin: As above  Eyes: negative  Ears/Nose/Throat: negative  Respiratory: No shortness of breath, dyspnea on exertion, cough      O:   NAD, WDWN, Alert & Oriented, Mood & Affect wnl, Vitals stable   Here today alone   /75   Pulse 72   SpO2 97%    General appearance normal   Vitals stable   Alert, oriented and in no acute distress     Brown stuck on papule on left frontal scalp       Eyes: Conjunctivae/lids:Normal     ENT: Lips: normal    MSK:Normal     Pulm: Breathing Normal     Neuro/Psych: Orientation:Alert and Orientedx3 ; Mood/Affect:normal   A/P:  1. Inflamed seborrheic keratosis on left frontal scalp x 1  LN2:  Treated with LN2 for 5s for 1-2 cycles. Warned risks of blistering, pain, pigment change, scarring, and incomplete resolution.  Advised patient to return if lesions do not completely resolve.  Wound care sheet given.        Again, thank you for allowing me to participate in the care of your patient.        Sincerely,        Rosalind Ochoa PA-C

## 2021-07-24 ENCOUNTER — NURSE TRIAGE (OUTPATIENT)
Dept: NURSING | Facility: CLINIC | Age: 61
End: 2021-07-24

## 2021-07-24 NOTE — TELEPHONE ENCOUNTER
"Patient calling as has had a heart murmur since childhood.  2 [place] nd all of a sudden he \"felt a rush over his body like I was going to pass out\".  He reports he couldn't breath either.  He sat down and was able to calm down.  It happened again 10 minutes later.      It hasn't happened again since then.     He is having no appetite, he has been forcing himself to eat, but not on a regular schedule.  He hasn't been drinking much other than popscicles and koolaid.       Per protocol, advised to be seen in the next 3 days.  Tx to scheduling to obtain appointment.    COVID 19 Nurse Triage Plan/Patient Instructions    Please be aware that novel coronavirus (COVID-19) may be circulating in the community. If you develop symptoms such as fever, cough, or SOB or if you have concerns about the presence of another infection including coronavirus (COVID-19), please contact your health care provider or visit [email or web address].     Disposition/Instructions    In-Person Visit with provider recommended. Reference Visit Selection Guide.    Thank you for taking steps to prevent the spread of this virus.  o Limit your contact with others.  o Wear a simple mask to cover your cough.  o Wash your hands well and often.    Resources    [facility]: About COVID-19: [email or web address].[email or web address]    CDC: What to Do If You're Sick: www.cdc.gov/coronavirus/2019-ncov/about/steps-when-sick.html    CDC: Ending Home Isolation: www.cdc.gov/coronavirus/2019-ncov/hcp/disposition-in-home-patients.html     CDC: Caring for Someone: www.cdc.gov/coronavirus/2019-ncov/if-you-are-sick/care-for-someone.html     [facility]: Interim Guidance for Hospital Discharge to Home: www.health.[facility].[place]/diseases/coronavirus/hcp/hospdischarge.pdf    [facility] clinical trials (COVID-19 research studies): [email or web address]/n-clinical-trials     Below are the COVID-19 hotlines at the " "[place] Department of Health ([facility]). Interpreters are available.   o For health questions: Call 514-040-1743 or 1-592.359.7967 (7 a.m. to 7 p.m.)  o For questions about schools and childcare: Call 940-610-5848 or 1-614.923.1743 (7 a.m. to 7 p.m.)                             Reason for Disposition    [1] MILD dizziness (e.g., walking normally) AND [2] has NOT been evaluated by physician for this  (Exception: dizziness caused by heat exposure, sudden standing, or poor fluid intake)    Additional Information    Negative: Severe difficulty breathing (e.g., struggling for each breath, speaks in single words)    Negative: [1] Difficulty breathing or swallowing AND [2] started suddenly after medicine, an allergic food or bee sting    Negative: Shock suspected (e.g., cold/pale/clammy skin, too weak to stand, low BP, rapid pulse)    Negative: Difficult to awaken or acting confused (e.g., disoriented, slurred speech)    Negative: [1] Weakness (i.e., paralysis, loss of muscle strength) of the face, arm or leg on one side of the body AND [2] sudden onset AND [3] present now    Negative: [1] Numbness (i.e., loss of sensation) of the face, arm or leg on one side of the body AND [2] sudden onset AND [3] present now    Negative: [1] Loss of speech or garbled speech AND [2] sudden onset AND [3] present now    Negative: Overdose (accidental or intentional) of medications    Negative: [1] Fainted > 15 minutes ago AND [2] still feels too weak or dizzy to stand    Negative: Heart beating < 50 beats per minute OR > 140 beats per minute    Negative: Sounds like a life-threatening emergency to the triager    Negative: Chest pain    Negative: Rectal bleeding, bloody stool, or tarry-black stool    Negative: [1] Vomiting AND [2] contains red blood or black (\"coffee ground\") material    Negative: Vomiting is main symptom    Negative: Diarrhea is main symptom    Negative: Headache is main symptom    Negative: Patient states that he/she is " "having an anxiety/panic attack    Negative: Dizziness from low blood sugar (i.e., < 60 mg/dl or 3.5 mmol/l)    Negative: Dizziness is described as a spinning sensation (i.e., vertigo)    Negative: Heat exhaustion suspected (i.e., dehydration from heat exposure)    Negative: Difficulty breathing    Negative: SEVERE dizziness (e.g., unable to stand, requires support to walk, feels like passing out now)    Negative: Extra heart beats OR irregular heart beating  (i.e., \"palpitations\")    Negative: [1] Drinking very little AND [2] dehydration suspected (e.g., no urine > 12 hours, very dry mouth, very lightheaded)    Negative: Patient sounds very sick or weak to the triager    Negative: [1] Dizziness caused by heat exposure, sudden standing, or poor fluid intake AND [2] no improvement after 2 hours of rest and fluids    Negative: [1] Fever > 103 F (39.4 C) AND [2] not able to get the fever down using Fever Care Advice    Negative: [1] Fever > 101 F (38.3 C) AND [2] age > 60    Negative: [1] Fever > 100.0 F (37.8 C) AND [2] bedridden (e.g., nursing home patient, CVA, chronic illness, recovering from surgery)    Negative: [1] Fever > 100.0 F (37.8 C) AND [2] diabetes mellitus or weak immune system (e.g., HIV positive, cancer chemo, splenectomy, organ transplant, chronic steroids)    Negative: [1] MODERATE dizziness (e.g., interferes with normal activities) AND [2] has NOT been evaluated by physician for this  (Exception: dizziness caused by heat exposure, sudden standing, or poor fluid intake)    Negative: Fever present > 3 days (72 hours)    Negative: Taking a medicine that could cause dizziness (e.g., blood pressure medications, diuretics)    Negative: [1] MODERATE dizziness (e.g., interferes with normal activities) AND [2] has been evaluated by physician for this    Protocols used: DIZZINESS - [id number]      "

## 2021-07-26 ENCOUNTER — OFFICE VISIT (OUTPATIENT)
Dept: FAMILY MEDICINE | Facility: CLINIC | Age: 61
End: 2021-07-26
Payer: COMMERCIAL

## 2021-07-26 VITALS
TEMPERATURE: 97.5 F | OXYGEN SATURATION: 98 % | SYSTOLIC BLOOD PRESSURE: 130 MMHG | DIASTOLIC BLOOD PRESSURE: 82 MMHG | WEIGHT: 175.2 LBS | BODY MASS INDEX: 21.33 KG/M2 | HEART RATE: 86 BPM | RESPIRATION RATE: 16 BRPM

## 2021-07-26 DIAGNOSIS — R42 DIZZINESS: Primary | ICD-10-CM

## 2021-07-26 LAB
ALBUMIN SERPL-MCNC: 3.7 G/DL (ref 3.4–5)
ALP SERPL-CCNC: 67 U/L (ref 40–150)
ALT SERPL W P-5'-P-CCNC: 22 U/L (ref 0–70)
ANION GAP SERPL CALCULATED.3IONS-SCNC: <1 MMOL/L (ref 3–14)
AST SERPL W P-5'-P-CCNC: 14 U/L (ref 0–45)
BASOPHILS # BLD AUTO: 0 10E3/UL (ref 0–0.2)
BASOPHILS NFR BLD AUTO: 0 %
BILIRUB SERPL-MCNC: 0.6 MG/DL (ref 0.2–1.3)
BUN SERPL-MCNC: 17 MG/DL (ref 7–30)
CALCIUM SERPL-MCNC: 8.5 MG/DL (ref 8.5–10.1)
CHLORIDE BLD-SCNC: 110 MMOL/L (ref 94–109)
CO2 SERPL-SCNC: 30 MMOL/L (ref 20–32)
CREAT SERPL-MCNC: 0.74 MG/DL (ref 0.66–1.25)
EOSINOPHIL # BLD AUTO: 0 10E3/UL (ref 0–0.7)
EOSINOPHIL NFR BLD AUTO: 0 %
ERYTHROCYTE [DISTWIDTH] IN BLOOD BY AUTOMATED COUNT: 14 % (ref 10–15)
GFR SERPL CREATININE-BSD FRML MDRD: >90 ML/MIN/1.73M2
GLUCOSE BLD-MCNC: 104 MG/DL (ref 70–99)
HCT VFR BLD AUTO: 46.4 % (ref 40–53)
HGB BLD-MCNC: 15.1 G/DL (ref 13.3–17.7)
LYMPHOCYTES # BLD AUTO: 2.6 10E3/UL (ref 0.8–5.3)
LYMPHOCYTES NFR BLD AUTO: 26 %
MCH RBC QN AUTO: 28.9 PG (ref 26.5–33)
MCHC RBC AUTO-ENTMCNC: 32.5 G/DL (ref 31.5–36.5)
MCV RBC AUTO: 89 FL (ref 78–100)
MONOCYTES # BLD AUTO: 0.6 10E3/UL (ref 0–1.3)
MONOCYTES NFR BLD AUTO: 6 %
NEUTROPHILS # BLD AUTO: 6.9 10E3/UL (ref 1.6–8.3)
NEUTROPHILS NFR BLD AUTO: 68 %
PLATELET # BLD AUTO: 213 10E3/UL (ref 150–450)
POTASSIUM BLD-SCNC: 4.1 MMOL/L (ref 3.4–5.3)
PROT SERPL-MCNC: 6.5 G/DL (ref 6.8–8.8)
RBC # BLD AUTO: 5.22 10E6/UL (ref 4.4–5.9)
SODIUM SERPL-SCNC: 140 MMOL/L (ref 133–144)
WBC # BLD AUTO: 10.1 10E3/UL (ref 4–11)

## 2021-07-26 PROCEDURE — 80053 COMPREHEN METABOLIC PANEL: CPT | Performed by: FAMILY MEDICINE

## 2021-07-26 PROCEDURE — 99213 OFFICE O/P EST LOW 20 MIN: CPT | Performed by: FAMILY MEDICINE

## 2021-07-26 PROCEDURE — 85025 COMPLETE CBC W/AUTO DIFF WBC: CPT | Performed by: FAMILY MEDICINE

## 2021-07-26 PROCEDURE — 36415 COLL VENOUS BLD VENIPUNCTURE: CPT | Performed by: FAMILY MEDICINE

## 2021-07-26 NOTE — LETTER
July 27, 2021      Rhett Valentin  [address] [place] [address]        Dear ,    We are writing to inform you of your test results.  Please inform patient that test result was within normal parameters.   Thank you.     Charles Leija M.D.         Resulted Orders   Comprehensive metabolic panel (BMP + Alb, Alk Phos, ALT, AST, Total. Bili, TP)   Result Value Ref Range    Sodium 140 133 - 144 mmol/L    Potassium 4.1 3.4 - 5.3 mmol/L    Chloride 110 (H) 94 - 109 mmol/L    Carbon Dioxide (CO2) 30 20 - 32 mmol/L    Anion Gap <1 (L) 3 - 14 mmol/L    Urea Nitrogen 17 7 - 30 mg/dL    Creatinine 0.74 0.66 - 1.25 mg/dL    Calcium 8.5 8.5 - 10.1 mg/dL    Glucose 104 (H) 70 - 99 mg/dL    Alkaline Phosphatase 67 40 - 150 U/L    AST 14 0 - 45 U/L    ALT 22 0 - 70 U/L    Protein Total 6.5 (L) 6.8 - 8.8 g/dL    Albumin 3.7 3.4 - 5.0 g/dL    Bilirubin Total 0.6 0.2 - 1.3 mg/dL    GFR Estimate >90 >60 mL/min/1.73m2      Comment:      As of July 11, 2021, eGFR is calculated by the CKD-EPI creatinine equation, without race adjustment. eGFR can be influenced by muscle mass, exercise, and diet. The reported eGFR is an estimation only and is only applicable if the renal function is stable.   CBC with platelets and differential   Result Value Ref Range    WBC Count 10.1 4.0 - 11.0 10e3/uL    RBC Count 5.22 4.40 - 5.90 10e6/uL    Hemoglobin 15.1 13.3 - 17.7 g/dL    Hematocrit 46.4 40.0 - 53.0 %    MCV 89 78 - 100 fL    MCH 28.9 26.5 - 33.0 pg    MCHC 32.5 31.5 - 36.5 g/dL    RDW 14.0 10.0 - 15.0 %    Platelet Count 213 150 - 450 10e3/uL    % Neutrophils 68 %    % Lymphocytes 26 %    % Monocytes 6 %    % Eosinophils 0 %    % Basophils 0 %    Absolute Neutrophils 6.9 1.6 - 8.3 10e3/uL    Absolute Lymphocytes 2.6 0.8 - 5.3 10e3/uL    Absolute Monocytes 0.6 0.0 - 1.3 10e3/uL    Absolute Eosinophils 0.0 0.0 - 0.7 10e3/uL    Absolute Basophils 0.0 0.0 - 0.2 10e3/uL       If you have any questions or concerns, please call  the clinic at the number listed above.       Sincerely,      Charles Leija MD

## 2021-07-26 NOTE — PROGRESS NOTES
Assessment & Plan     Dizziness  Labs ordered. If symptoms persist recommend a stress echo. This is likely heat exhaustion.   - Comprehensive metabolic panel (BMP + Alb, Alk Phos, ALT, AST, Total. Bili, TP)  - CBC with platelets and differential  - Echocardiogram Exercise Stress; Future    Tobacco Cessation:   reports that he has been smoking. He has been smoking about 0.50 packs per day. He has quit using smokeless tobacco.  Tobacco Cessation Action Plan: Information offered: Patient not interested at this time    FUTURE APPOINTMENTS:       - Follow-up visit in 1-2 weeks.     Return in about 2 years (around 7/26/2023) for Follow up.    Charles Leija MD  [facility] CenterDAINA Delaney is a 61 year old who presents for the following health issues     HPI     Dizziness  Onset/Duration: had an episode about two weeks ago while stacking rocks. Says the temp was in the 90s. Had only started stacking the rocks when he had a sudden onset of dizziness, felt like he was going to pass out. Lasted about 30 secs to a 1 minute also had some chest tightness and shortness of breath. May have been panicking a bit as he says he was in the middle of nowhere.   Description:   Do you feel faint: YES  Does it feel like the surroundings (bed, room) are moving: no  Unsteady/off balance: YES  Have you passed out or fallen: no  Intensity: moderate  Progression of Symptoms: same  Accompanying Signs & Symptoms:  Heart palpitations or chest pain: YES  Nausea, vomiting: no  Weakness or lack of coordination in arms or legs: no  Vision or speech changes: no  Numbness or tingling: no  Ringing in ears (Tinnitus): no  Hearing Loss: no  History:   Head trauma/concussion history: no  Previous similar symptoms: no  Recent bleeding history: no  Any new medications (BP?): no  Precipitating factors:   Worse with activity: no  Worse with head movement: no  Alleviating factors:   Does staying in a fixed position  give relief: no   Therapies tried and outcome: None        Review of Systems   Constitutional, HEENT, cardiovascular, pulmonary, gi and gu systems are negative, except as otherwise noted.      Objective    /82   Pulse 86   Temp 97.5  F (36.4  C) (Tympanic)   Resp 16   Wt 79.5 kg (175 lb 3.2 oz)   SpO2 98%   BMI 21.33 kg/m    Body mass index is 21.33 kg/m .  Physical Exam   GENERAL: healthy, alert and no distress  EYES: Eyes grossly normal to inspection, PERRL and conjunctivae and sclerae normal  HENT: ear canals and TM's normal, nose and mouth without ulcers or lesions  NECK: no adenopathy, no asymmetry, masses, or scars and thyroid normal to palpation  RESP: lungs clear to auscultation - no rales, rhonchi or wheezes  CV: regular rate and rhythm, normal S1 S2, no S3 or S4, no murmur, click or rub, no peripheral edema and peripheral pulses strong  ABDOMEN: soft, nontender, no hepatosplenomegaly, no masses and bowel sounds normal  MS: no gross musculoskeletal defects noted, no edema

## 2021-07-27 NOTE — RESULT ENCOUNTER NOTE
Please inform patient that test result was within normal parameters.   Thank you.     Charles Leija M.D.

## 2021-08-18 ENCOUNTER — ANCILLARY PROCEDURE (OUTPATIENT)
Dept: GENERAL RADIOLOGY | Facility: CLINIC | Age: 61
End: 2021-08-18
Attending: FAMILY MEDICINE
Payer: COMMERCIAL

## 2021-08-18 ENCOUNTER — OFFICE VISIT (OUTPATIENT)
Dept: FAMILY MEDICINE | Facility: CLINIC | Age: 61
End: 2021-08-18
Payer: COMMERCIAL

## 2021-08-18 VITALS
RESPIRATION RATE: 16 BRPM | SYSTOLIC BLOOD PRESSURE: 110 MMHG | DIASTOLIC BLOOD PRESSURE: 68 MMHG | OXYGEN SATURATION: 96 % | TEMPERATURE: 97 F | BODY MASS INDEX: 20.94 KG/M2 | HEART RATE: 80 BPM | WEIGHT: 172 LBS

## 2021-08-18 DIAGNOSIS — M41.9 SCOLIOSIS OF LUMBAR SPINE, UNSPECIFIED SCOLIOSIS TYPE: ICD-10-CM

## 2021-08-18 DIAGNOSIS — M54.50 ACUTE MIDLINE LOW BACK PAIN WITHOUT SCIATICA: Primary | ICD-10-CM

## 2021-08-18 PROCEDURE — 72100 X-RAY EXAM L-S SPINE 2/3 VWS: CPT | Performed by: RADIOLOGY

## 2021-08-18 PROCEDURE — 99214 OFFICE O/P EST MOD 30 MIN: CPT | Performed by: FAMILY MEDICINE

## 2021-08-18 NOTE — PATIENT INSTRUCTIONS
Back xray showed mild scoliosis. Final readin g from radiology will be relayed to you in 24 hours.    Acetaminophen 500 mg orally 1-2 tabs every 4-6 hrs as needed for pain.    If not better or if worsening after 2 weeks, see provider again.      Patient Education     General Neck and Back Pain    Both neck and back pain are usually caused by injury to the muscles or ligaments of the spine. Sometimes the disks that separate each bone of the spine may cause pain by pressing on a nearby nerve. Back and neck pain may appear after a sudden twisting or bending force (such as in a car accident), or sometimes after a simple awkward movement. In either case, muscle spasm is often present and adds to the pain.   Acute neck and back pain usually gets better in 1 to 2 weeks. Pain related to disk disease, arthritis in the spinal joints, or narrowing of the spinal canal (spinal stenosis) can become chronic and last for months or years.   Back and neck pain are common problems. Most people feel better in 1 or 2 weeks, and most of the rest in 1 to 2 months. Most people can remain active.   People have and describe pain differently.    Pain can be sharp, stabbing, shooting, aching, cramping, or burning    Movement, standing, bending, lifting, sitting, or walking may worsen the pain    Pain can be limited to one spot or area, or it can be more generalized    Pain can spread upward, downward, to the front, or go down your arms or legs    Muscle spasm may occur.  Most of the time mechanical problems with the muscles or spine cause the pain. It's usually caused by an injury, whether known or not, to the muscles or ligaments. Pain without an injury is not common. But it can sometimes be caused by a health problem such as kidney stones or an infection. Pain is usually related to physical activity such as sports, exercise, work, or normal activity. Sometimes it can occur without an identifiable cause. This can happen simply by stretching  or moving wrong, without noting pain at the time. Other causes include:     Overexertion, lifting, pushing, pulling incorrectly or too aggressively.    Sudden twisting, bending or stretching from an accident (car or fall), or accidental movement.    Poor posture    Poor conditioning, lack of regular exercise    Spinal disc disease or arthritis    Stress    Pregnancy, or illness like appendicitis, bladder or kidney infection, pelvic infections   Home care    For neck pain: Use a comfortable pillow that supports the head and keeps the spine in a neutral position. The position of the head should not be tilted forward or backward.    When in bed, try to find a position of comfort. A firm mattress is best. Try lying flat on your back with pillows under your knees. You can also try lying on your side with your knees bent up towards your chest and a pillow between your knees.    At first, don't try to stretch out the sore spots. If there is a strain, it's not like the good soreness you get after exercising without an injury. In this case, stretching may make it worse.    Don't sit for long periods, as in long car rides or other travel. This puts more stress on the lower back than standing or walking.    During the first 24 to 72 hours after an injury, apply an ice pack to the painful area for 20 minutes and then remove it for 20 minutes over a period of 60 to 90 minutes or several times a day.     You can alternate ice and heat therapies. Talk with your healthcare provider about the best treatment for your back or neck pain. As a safety precaution, don't use a heating pad at bedtime. Sleeping with a heating pad can lead to skin burns or tissue damage.    Therapeutic massage can help relax the back and neck muscles without stretching them.    Be aware of safe lifting methods and don't lift anything over 15 pounds until all the pain is gone.    Medicines  Talk to your healthcare provider before using medicine, especially if  you have other medical problems or are taking other medicines.     You may use over-the-counter medicine to control pain, unless another pain medicine was prescribed. Talk with your doctor first if you have chronic conditions like diabetes, liver or kidney disease, stomach ulcers, gastrointestinal bleeding, or are taking blood thinner medicines.    Be careful if you are given pain medicines, narcotics, or medicine for muscle spasm. They can cause drowsiness, and can affect your coordination, reflexes, and judgment. Don't drive or operate heavy machinery.  Follow-up care  Follow up with your healthcare provider, or as advised. You may need physical therapy or further tests.   If X-rays were taken, you will be told of any new findings that may affect your care.   Call 911  Call 911 if any of the following occur:     Trouble breathing    Confusion    Very drowsy or trouble awakening    Fainting or loss of consciousness    Rapid or very slow heart rate    Loss of bowel or bladder control  When to seek medical advice  Call your healthcare provider right away if any of these occur:    Pain becomes worse or spreads into your arms or legs    Weakness, numbness or pain in one or both arms or legs    Numbness in the groin area    Trouble walking    Fever of 100.4 F (38 C) or higher, or as directed by your healthcare provider  [employer]io last reviewed this educational content on 10/1/2019    [id number] [employer]. All rights reserved. This information is not intended as a substitute for professional medical care. Always follow your healthcare professional's instructions.

## 2021-08-18 NOTE — PROGRESS NOTES
Assessment & Plan     Acute midline low back pain without sciatica  - XR Lumbar Spine 2/3 Views  No red flags on exam. Patient reported transient perineal numbness - not present now. Xray ordered due to that.  Patient was advised of xray images findings - radiology reading pending.  Advised pain is consistent with strain.  Discussed usual course and treatment of back strains  Symptomatic measures discussed in detail as in AVS.  Precautions given.  Advised safe use of otc APAP.  Return precautions discussed and given to patient.     Scoliosis of lumbar spine, unspecified scoliosis type - mild  Advised patient likely not cause of pain.  Patient Instructions   Back xray showed mild scoliosis. Final readin g from radiology will be relayed to you in 24 hours.    Acetaminophen 500 mg orally 1-2 tabs every 4-6 hrs as needed for pain.    If not better or if worsening after 2 weeks, see provider again.      Patient Education     General Neck and Back Pain    Both neck and back pain are usually caused by injury to the muscles or ligaments of the spine. Sometimes the disks that separate each bone of the spine may cause pain by pressing on a nearby nerve. Back and neck pain may appear after a sudden twisting or bending force (such as in a car accident), or sometimes after a simple awkward movement. In either case, muscle spasm is often present and adds to the pain.   Acute neck and back pain usually gets better in 1 to 2 weeks. Pain related to disk disease, arthritis in the spinal joints, or narrowing of the spinal canal (spinal stenosis) can become chronic and last for months or years.   Back and neck pain are common problems. Most people feel better in 1 or 2 weeks, and most of the rest in 1 to 2 months. Most people can remain active.   People have and describe pain differently.    Pain can be sharp, stabbing, shooting, aching, cramping, or burning    Movement, standing, bending, lifting, sitting, or walking may worsen  the pain    Pain can be limited to one spot or area, or it can be more generalized    Pain can spread upward, downward, to the front, or go down your arms or legs    Muscle spasm may occur.  Most of the time mechanical problems with the muscles or spine cause the pain. It's usually caused by an injury, whether known or not, to the muscles or ligaments. Pain without an injury is not common. But it can sometimes be caused by a health problem such as kidney stones or an infection. Pain is usually related to physical activity such as sports, exercise, work, or normal activity. Sometimes it can occur without an identifiable cause. This can happen simply by stretching or moving wrong, without noting pain at the time. Other causes include:     Overexertion, lifting, pushing, pulling incorrectly or too aggressively.    Sudden twisting, bending or stretching from an accident (car or fall), or accidental movement.    Poor posture    Poor conditioning, lack of regular exercise    Spinal disc disease or arthritis    Stress    Pregnancy, or illness like appendicitis, bladder or kidney infection, pelvic infections   Home care    For neck pain: Use a comfortable pillow that supports the head and keeps the spine in a neutral position. The position of the head should not be tilted forward or backward.    When in bed, try to find a position of comfort. A firm mattress is best. Try lying flat on your back with pillows under your knees. You can also try lying on your side with your knees bent up towards your chest and a pillow between your knees.    At first, don't try to stretch out the sore spots. If there is a strain, it's not like the good soreness you get after exercising without an injury. In this case, stretching may make it worse.    Don't sit for long periods, as in long car rides or other travel. This puts more stress on the lower back than standing or walking.    During the first 24 to 72 hours after an injury, apply an ice  pack to the painful area for 20 minutes and then remove it for 20 minutes over a period of 60 to 90 minutes or several times a day.     You can alternate ice and heat therapies. Talk with your healthcare provider about the best treatment for your back or neck pain. As a safety precaution, don't use a heating pad at bedtime. Sleeping with a heating pad can lead to skin burns or tissue damage.    Therapeutic massage can help relax the back and neck muscles without stretching them.    Be aware of safe lifting methods and don't lift anything over 15 pounds until all the pain is gone.    Medicines  Talk to your healthcare provider before using medicine, especially if you have other medical problems or are taking other medicines.     You may use over-the-counter medicine to control pain, unless another pain medicine was prescribed. Talk with your doctor first if you have chronic conditions like diabetes, liver or kidney disease, stomach ulcers, gastrointestinal bleeding, or are taking blood thinner medicines.    Be careful if you are given pain medicines, narcotics, or medicine for muscle spasm. They can cause drowsiness, and can affect your coordination, reflexes, and judgment. Don't drive or operate heavy machinery.  Follow-up care  Follow up with your healthcare provider, or as advised. You may need physical therapy or further tests.   If X-rays were taken, you will be told of any new findings that may affect your care.   Call 911  Call 911 if any of the following occur:     Trouble breathing    Confusion    Very drowsy or trouble awakening    Fainting or loss of consciousness    Rapid or very slow heart rate    Loss of bowel or bladder control  When to seek medical advice  Call your healthcare provider right away if any of these occur:    Pain becomes worse or spreads into your arms or legs    Weakness, numbness or pain in one or both arms or legs    Numbness in the groin area    Trouble walking    Fever of 100.4 F  (38 C) or higher, or as directed by your healthcare provider  Admitly last reviewed this educational content on 10/1/2019    2763-5673 [employer]. All rights reserved. This information is not intended as a substitute for professional medical care. Always follow your healthcare professional's instructions.               Return in about 2 weeks (around 9/1/2021) for In-clinic visit for if not better or if worsening.    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  Description:   Location of pain: low back center. Patient thinking it is his sciatic nerve.  Character of pain: sharp and dull ache  Pain radiation: none  New numbness or weakness in legs, not attributed to pain: YES- numbness in the back groin area  Intensity: mild to moderate  Progression of Symptoms: Slightly improving  History:   Specific cause: none  Pain interferes with job: not applicable  History of back problems: no prior back problems  Any previous MRI or X-rays: None  Sees a specialist for back pain: No  Alleviating factors:   Improved by: ibuprofen, yoga exercises and cold    Precipitating factors:  Worsened by: Walking and bending as he is sitting.  Therapies tried and outcome: yoga, and ibuprofen and cold    Accompanying Signs & Symptoms:  Risk of Fracture: None  Risk of Cauda Equina: Numbness in the groin/pelvic area (saddle anesthesia) - not present now - patient said lasted only a few minutes  Risk of Infection: None  Risk of Cancer: None  Risk of Ankylosing Spondylitis: Onset at age <35, male, AND morning back stiffness  no     Patient Active Problem List   Diagnosis     Tobacco use disorder     Past Surgical History:   Procedure Laterality Date     ORTHOPEDIC SURGERY      left arm       Social History     Tobacco Use     Smoking status: Current  Every Day Smoker     Packs/day: 0.50     Smokeless tobacco: Former User   Substance Use Topics     Alcohol use: Yes     Comment: 1 beer per night     History reviewed. No pertinent family history.      No current outpatient medications on file.     No Known Allergies    Review of Systems   GENERAL: healthy, alert and no distress  NECK: no tenderness, no adenopathy, no asymmetry, no masses, no stiffness  MS: extremities- no gross deformities noted, no edema  SKIN: no suspicious lesions, no rashes  NEURO: strength and tone- normal, sensory exam- grossly normal, reflexes- symmetric  BACK: normal curvature, no deformity, no skin changes, no tendernes on palpation, range of motion full, SLR negative bilaterally      Objective    /68   Pulse 80   Temp 97  F (36.1  C) (Tympanic)   Resp 16   Wt 78 kg (172 lb)   SpO2 96%   BMI 20.94 kg/m    Body mass index is 20.94 kg/m .  Physical Exam   GENERAL: underweight, alert and no distress  NECK: no tenderness, no adenopathy, no asymmetry, no masses, no stiffness  MS: extremities- no gross deformities noted, no edema  SKIN: no suspicious lesions, no rashes  NEURO: strength and tone- normal, sensory exam- grossly normal, reflexes- symmetric  BACK: normal curvature, no deformity, no skin changes, no tendernes on palpation, range of motion full with mild pain on extension, SLR negative bilaterally    Lumbar Xray reviewed by me: mild levoscoliosis, no gross anterolisthesis or vertebral collapse - radiology report pending.

## 2022-01-11 ENCOUNTER — ANCILLARY PROCEDURE (OUTPATIENT)
Dept: GENERAL RADIOLOGY | Facility: CLINIC | Age: 62
End: 2022-01-11
Attending: FAMILY MEDICINE
Payer: COMMERCIAL

## 2022-01-11 ENCOUNTER — OFFICE VISIT (OUTPATIENT)
Dept: ORTHOPEDICS | Facility: CLINIC | Age: 62
End: 2022-01-11
Payer: COMMERCIAL

## 2022-01-11 VITALS
DIASTOLIC BLOOD PRESSURE: 86 MMHG | SYSTOLIC BLOOD PRESSURE: 125 MMHG | HEIGHT: 76 IN | BODY MASS INDEX: 20.95 KG/M2 | WEIGHT: 172 LBS

## 2022-01-11 DIAGNOSIS — M75.82 ROTATOR CUFF TENDINITIS, LEFT: ICD-10-CM

## 2022-01-11 DIAGNOSIS — M25.512 PAIN IN JOINT OF LEFT SHOULDER: Primary | ICD-10-CM

## 2022-01-11 DIAGNOSIS — M25.551 ACUTE PAIN OF RIGHT HIP: ICD-10-CM

## 2022-01-11 DIAGNOSIS — M19.012 PRIMARY OSTEOARTHRITIS OF LEFT SHOULDER: ICD-10-CM

## 2022-01-11 DIAGNOSIS — M25.512 PAIN IN JOINT OF LEFT SHOULDER: ICD-10-CM

## 2022-01-11 PROCEDURE — 73030 X-RAY EXAM OF SHOULDER: CPT | Mod: LT | Performed by: RADIOLOGY

## 2022-01-11 PROCEDURE — 73502 X-RAY EXAM HIP UNI 2-3 VIEWS: CPT | Mod: RT | Performed by: RADIOLOGY

## 2022-01-11 PROCEDURE — 99204 OFFICE O/P NEW MOD 45 MIN: CPT | Performed by: FAMILY MEDICINE

## 2022-01-11 ASSESSMENT — MIFFLIN-ST. JEOR: SCORE: 1696.69

## 2022-01-11 NOTE — PROGRESS NOTES
"Rhett Valentin  :  1960  DOS: 2022  MRN: [id number]    Sports Medicine Clinic Visit    PCP: No Ref-Primary, Physician    Rhett Valentin is a 61 year old Right hand dominant male who is seen as a self referral presenting with acute left shoulder pain.    Injury: Gradual onset of pain over the past ~ 4+ weeks.  Pain located over left deep anterior shoulder, radiating to anterior upper arm.  Additional Features:  Positive: weakness.  Symptoms are better with moving shoulder.  Symptoms are worse with: lying in bed supine, reaching.  Other evaluation and/or treatments so far consists of: Ibuprofen and Rest.  Recent imaging completed: No recent imaging completed.  Prior History of related problems: history of intermittent shoulder pain over the past several years.  Previous steroid injection in  provided good relief.    Second complaint of acute right posterior lateral hip pain over the past ~ 3 - 4 days.  Pain is worse with lying on right side and stretching right hip.  No current treatment completed.  No prior imaging or history of similar right hip pain in the past.    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    History reviewed. No pertinent past medical history.  Past Surgical History:   Procedure Laterality Date     ORTHOPEDIC SURGERY      left arm     History reviewed. No pertinent family history.    Objective  /86   Ht 1.93 m (6' 4\")   Wt 78 kg (172 lb)   BMI 20.94 kg/m        General: healthy, alert and in no distress      HEENT: no scleral icterus or conjunctival erythema     Skin: no suspicious lesions or rash. No jaundice.     CV: regular rhythm by palpation, 2+ distal pulses, no pedal edema      Resp: normal respiratory effort without conversational dyspnea "     Psych: normal mood and affect      Gait: nonantalgic, appropriate coordination and balance     Neuro: normal light touch sensory exam of the extremities. Motor strength as noted below     Left Shoulder exam    ROM:        Mildly limited terminal active and passive ROM with flexion, extension, abduction, internal and external rotation.    Tender:        subacromial space       posterior shoulder       Anterior GH joint    Non Tender:       remainder of shoulder       sternoclavicular joint       acromioclavicular joint    Strength:        abduction 5-/5       internal rotation 5/5       external rotation 5/5       adduction 5/5    Impingement testing:        neg (-) Neer       Mild pain Benitez       Mild pain empty can       neg (-) crossover       Mild pain crank    Stability testing:       neg (-) anterior glide       neg (-) sulcus sign    Skin:       no visible deformities       well perfused       capillary refill brisk    Sensation:        normal sensation over shoulder and upper extremity     Right hip exam    Inspection:        no edema or ecchymosis in hip area    ROM:       Full active and passive ROM       painful adduction and ER, mild and localized over lateral hip    Strength:        flexion 5/5       extension 5/5       abduction 5/5       adduction 4/5 due to pain    Tender:        greater trochanter       SI joint but chronic, known and unrelated    Non Tender:        remainder of hip area       illiac crest       ASIS       pubis    Sensation:        grossly intact in hip and thigh    Skin:       well perfused       capillary refill brisk    Special Tests:        neg (-) VALENTINA       neg (-) FADIR       neg (-) scour       positive (+) Zeus    Radiology  Recent Results (from the past 744 hour(s))   XR Pelvis w Hip Right 1 View    Narrative    XR PELVIS AND HIP RIGHT 1 VIEW 1/11/2022 2:46 PM     HISTORY: Acute pain of right hip    COMPARISON: None.      Impression    IMPRESSION: Mild  osteoarthritis of both hip joints. No fractures are  evident. No osseous lesion.    NOEL PANDEY MD         SYSTEM ID:  [id number]   XR Shoulder Left G/E 3 Views    Narrative    XR SHOULDER LEFT G/E 3 VIEWS 1/11/2022 2:46 PM     HISTORY: Pain in joint of left shoulder    COMPARISON: None.      Impression    IMPRESSION: Osteopenia. No fractures are identified. Minimal  hypertrophic change in the glenohumeral joint. Normal glenohumeral  alignment. The acromioclavicular joint is unremarkable.     NOEL PANDEY MD         SYSTEM ID:  [id number]         Assessment:  1. Pain in joint of left shoulder    2. Acute pain of right hip    3. Primary osteoarthritis of left shoulder    4. Rotator cuff tendinitis, left        Plan:  Discussed the assessment with the patient.  Follow up: prn based on clinical progress  Reviewed activity modification strategies to avoid pain, reviewed low impact exercises for hip  PT options reviewed for both shoulder/RTC sx and hip/bursitis sx  Some milder signs of GH joint pain on exam and imaging  Reviewed option for diagnostic and hopefully therapeutic CSI to GH joint vs subacromial bursa, as well as for hip bursa, if pain is worsening or not improving  Reviewed the relative risks and potential benefits/goals of CSI  Topical voltaren gel option reviewed  XR images independently visualized and reviewed with patient today in clinic  We discussed modified progressive pain-free activity as tolerated  Home handouts provided and supportive care reviewed  All questions were answered today  Contact us with additional questions or concerns  Signs and sx of concern reviewed      Noel Hinson DO, JODY  Sports Medicine Physician  [facility]    Time spent in chart review, one-on-one evaluation, discussion with patient regarding: nature of problem, clinical course, prior treatments, therapeutic options, shared-decision making, potential procedures and referrals, and  charting related to the visit: 33 minutes.  If applicable, time does not include time spent performing any procedure.        Disclaimer: This note consists of symbols derived from keyboarding, dictation and/or voice recognition software. As a result, there may be errors in the script that have gone undetected. Please consider this when interpreting information found in this chart.

## 2022-01-11 NOTE — LETTER
"    2022         RE: Rhett Valentin  [address] [place] [address]        Dear Colleague,    Thank you for referring your patient, Rhett Valentin, to the [facility] [place]. Please see a copy of my visit note below.    Rhett Valentin  :  1960  DOS: 2022  MRN: [id number]    Sports Medicine Clinic Visit    PCP: No Ref-Primary, Physician    Rhett Valentin is a 61 year old Right hand dominant male who is seen as a self referral presenting with acute left shoulder pain.    Injury: Gradual onset of pain over the past ~ 4+ weeks.  Pain located over left deep anterior shoulder, radiating to anterior upper arm.  Additional Features:  Positive: weakness.  Symptoms are better with moving shoulder.  Symptoms are worse with: lying in bed supine, reaching.  Other evaluation and/or treatments so far consists of: Ibuprofen and Rest.  Recent imaging completed: No recent imaging completed.  Prior History of related problems: history of intermittent shoulder pain over the past several years.  Previous steroid injection in  provided good relief.    Second complaint of acute right posterior lateral hip pain over the past ~ 3 - 4 days.  Pain is worse with lying on right side and stretching right hip.  No current treatment completed.  No prior imaging or history of similar right hip pain in the past.    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    History reviewed. No pertinent past medical history.  Past Surgical History:   Procedure Laterality Date     ORTHOPEDIC SURGERY      left arm     History reviewed. No pertinent family history.    Objective  /86   Ht 1.93 m (6' 4\")   Wt 78 kg (172 lb)   BMI 20.94 kg/m        General: healthy, alert and in no " distress      HEENT: no scleral icterus or conjunctival erythema     Skin: no suspicious lesions or rash. No jaundice.     CV: regular rhythm by palpation, 2+ distal pulses, no pedal edema      Resp: normal respiratory effort without conversational dyspnea     Psych: normal mood and affect      Gait: nonantalgic, appropriate coordination and balance     Neuro: normal light touch sensory exam of the extremities. Motor strength as noted below     Left Shoulder exam    ROM:        Mildly limited terminal active and passive ROM with flexion, extension, abduction, internal and external rotation.    Tender:        subacromial space       posterior shoulder       Anterior GH joint    Non Tender:       remainder of shoulder       sternoclavicular joint       acromioclavicular joint    Strength:        abduction 5-/5       internal rotation 5/5       external rotation 5/5       adduction 5/5    Impingement testing:        neg (-) Neer       Mild pain Benitez       Mild pain empty can       neg (-) crossover       Mild pain crank    Stability testing:       neg (-) anterior glide       neg (-) sulcus sign    Skin:       no visible deformities       well perfused       capillary refill brisk    Sensation:        normal sensation over shoulder and upper extremity     Right hip exam    Inspection:        no edema or ecchymosis in hip area    ROM:       Full active and passive ROM       painful adduction and ER, mild and localized over lateral hip    Strength:        flexion 5/5       extension 5/5       abduction 5/5       adduction 4/5 due to pain    Tender:        greater trochanter       SI joint but chronic, known and unrelated    Non Tender:        remainder of hip area       illiac crest       ASIS       pubis    Sensation:        grossly intact in hip and thigh    Skin:       well perfused       capillary refill brisk    Special Tests:        neg (-) VALENTINA       neg (-) FADIR       neg (-) scour       positive (+)  Zeus    Radiology  Recent Results (from the past 744 hour(s))   XR Pelvis w Hip Right 1 View    Narrative    XR PELVIS AND HIP RIGHT 1 VIEW 1/11/2022 2:46 PM     HISTORY: Acute pain of right hip    COMPARISON: None.      Impression    IMPRESSION: Mild osteoarthritis of both hip joints. No fractures are  evident. No osseous lesion.    NOEL PANDEY MD         SYSTEM ID:  [id number]   XR Shoulder Left G/E 3 Views    Narrative    XR SHOULDER LEFT G/E 3 VIEWS 1/11/2022 2:46 PM     HISTORY: Pain in joint of left shoulder    COMPARISON: None.      Impression    IMPRESSION: Osteopenia. No fractures are identified. Minimal  hypertrophic change in the glenohumeral joint. Normal glenohumeral  alignment. The acromioclavicular joint is unremarkable.     NOEL PANDEY MD         SYSTEM ID:  [id number]         Assessment:  1. Pain in joint of left shoulder    2. Acute pain of right hip    3. Primary osteoarthritis of left shoulder    4. Rotator cuff tendinitis, left        Plan:  Discussed the assessment with the patient.  Follow up: prn based on clinical progress  Reviewed activity modification strategies to avoid pain, reviewed low impact exercises for hip  PT options reviewed for both shoulder/RTC sx and hip/bursitis sx  Some milder signs of GH joint pain on exam and imaging  Reviewed option for diagnostic and hopefully therapeutic CSI to GH joint vs subacromial bursa, as well as for hip bursa, if pain is worsening or not improving  Reviewed the relative risks and potential benefits/goals of CSI  Topical voltaren gel option reviewed  XR images independently visualized and reviewed with patient today in clinic  We discussed modified progressive pain-free activity as tolerated  Home handouts provided and supportive care reviewed  All questions were answered today  Contact us with additional questions or concerns  Signs and sx of concern reviewed      Noel Hinson DO, CAMAYA  Sports Medicine Physician  [facility]    Time spent in chart review, one-on-one evaluation, discussion with patient regarding: nature of problem, clinical course, prior treatments, therapeutic options, shared-decision making, potential procedures and referrals, and charting related to the visit: 33 minutes.  If applicable, time does not include time spent performing any procedure.        Disclaimer: This note consists of symbols derived from keyboarding, dictation and/or voice recognition software. As a result, there may be errors in the script that have gone undetected. Please consider this when interpreting information found in this chart.      Again, thank you for allowing me to participate in the care of your patient.        Sincerely,        Noel Hinson, DO

## 2025-04-17 ENCOUNTER — OFFICE VISIT (OUTPATIENT)
Dept: ORTHOPEDICS | Facility: CLINIC | Age: 65
End: 2025-04-17
Payer: COMMERCIAL

## 2025-04-17 DIAGNOSIS — M75.82 ROTATOR CUFF TENDINITIS, LEFT: ICD-10-CM

## 2025-04-17 DIAGNOSIS — M25.512 ACUTE PAIN OF LEFT SHOULDER: Primary | ICD-10-CM

## 2025-04-17 NOTE — PROGRESS NOTES
Rhett Valentin  :  1960  DOS: 2025  MRN: [id number]    Sports Medicine Clinic Visit    PCP: No Ref-Primary, Physician    Rhett Valentin is a 61 year old Right hand dominant male who is seen as a self referral presenting with acute left shoulder pain.    Injury: Gradual onset of pain over the past ~ 4+ weeks.  Pain located over left deep anterior shoulder, radiating to anterior upper arm.  Additional Features:  Positive: weakness.  Symptoms are better with moving shoulder.  Symptoms are worse with: lying in bed supine, reaching.  Other evaluation and/or treatments so far consists of: Ibuprofen and Rest.  Recent imaging completed: No recent imaging completed.  Prior History of related problems: history of intermittent shoulder pain over the past several years.  Previous steroid injection in  provided good relief.    Second complaint of acute right posterior lateral hip pain over the past ~ 3 - 4 days.  Pain is worse with lying on right side and stretching right hip.  No current treatment completed.  No prior imaging or history of similar right hip pain in the past.    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  He did see me in  for the left shoulder and pain improved with HEP.  Previous right shoulder pain with significant RTC pathology, ultimately improved with biceps tendon injection and HEP.  No bruising or swelling after the fall, has not use medication but has tried some basic ROM and stretching which was helpful.    Review of Systems  Musculoskeletal: as above  Remainder of review of systems is negative including constitutional, CV, pulmonary, GI, Skin and Neurologic except as noted in HPI or medical history.    No past medical history on file.  Past Surgical History:   Procedure Laterality Date    ORTHOPEDIC SURGERY      left arm     No family  history on file.    Objective    General: healthy, alert and in no distress    HEENT: no scleral icterus or conjunctival erythema   Skin: no suspicious lesions or rash. No jaundice.   CV: regular rhythm by palpation, 2+ distal pulses, no pedal edema    Resp: normal respiratory effort without conversational dyspnea   Psych: normal mood and affect    Gait: nonantalgic, appropriate coordination and balance   Neuro: normal light touch sensory exam of the extremities. Motor strength as noted below     Left Shoulder exam    ROM:        Mildly limited terminal active and passive ROM with flexion, extension, abduction, internal and external rotation.    Tender:        subacromial space mild       Mild lateral deltoid       posterior shoulder minimal    Non Tender:       remainder of shoulder       sternoclavicular joint       acromioclavicular joint    Strength:        abduction 5-/5       internal rotation 5/5       external rotation 5/5       adduction 5/5    Impingement testing:        neg (-) Neer       Mild pain Benitez       Mild pain empty can       neg (-) crossover       Mild pain crank    Stability testing:       neg (-) anterior glide       neg (-) sulcus sign    Skin:       no visible deformities       well perfused       capillary refill brisk    Sensation:        normal sensation over shoulder and upper extremity     Radiology  XR Shoulder Left G/E 3 Views    Narrative    XR SHOULDER LEFT G/E 3 VIEWS 1/11/2022 2:46 PM     HISTORY: Pain in joint of left shoulder    COMPARISON: None.      Impression    IMPRESSION: Osteopenia. No fractures are identified. Minimal  hypertrophic change in the glenohumeral joint. Normal glenohumeral  alignment. The acromioclavicular joint is unremarkable.     MARLENI PANDEY MD         SYSTEM ID:  [id number]       Assessment:  1. Acute pain of left shoulder    2. Rotator cuff tendinitis, left        Plan:  Discussed the assessment with the patient.  Follow up: 3 weeks based on  clinical progress  Reviewed activity modification strategies to avoid pain, reviewed low impact exercises for hip  PT options reviewed for both shoulder/RTC sx  Reviewed option for diagnostic and hopefully therapeutic CSI to the subacromial bursa if pain is worsening or not improving  Reviewed the relative risks and potential benefits/goals of CSI  HEP provided, activity modification strategies reviewed  Very low suspicion for full-thickness RTC pathology based on exam today  Consider advanced imaging in the future if pain worsens or does not improve  Topical voltaren gel option reviewed  XR images independently visualized and reviewed with patient today in clinic  We discussed modified progressive pain-free activity as tolerated  Home handouts provided and supportive care reviewed  All questions were answered today  Contact us with additional questions or concerns  Signs and sx of concern reviewed      Noel Hinson DO, JODY  Sports Medicine Physician  [facility]    Time spent in chart review, one-on-one evaluation, discussion with patient regarding: nature of problem, clinical course, prior treatments, therapeutic options, shared-decision making, potential procedures and referrals, and charting related to the visit: 32 minutes.  If applicable, time does not include time spent performing any procedure.      Disclaimer: This note consists of symbols derived from keyboarding, dictation and/or voice recognition software. As a result, there may be errors in the script that have gone undetected. Please consider this when interpreting information found in this chart.

## 2025-04-17 NOTE — LETTER
2025      Rhett Valentin  [address] [place] [address]      Dear Colleague,    Thank you for referring your patient, Rhett Valentin, to the [facility] [place]. Please see a copy of my visit note below.    Rhett Valentin  :  1960  DOS: 2025  MRN: [id number]    Sports Medicine Clinic Visit    PCP: No Ref-Primary, Physician    Rhett Valentin is a 61 year old Right hand dominant male who is seen as a self referral presenting with acute left shoulder pain.    Injury: Gradual onset of pain over the past ~ 4+ weeks.  Pain located over left deep anterior shoulder, radiating to anterior upper arm.  Additional Features:  Positive: weakness.  Symptoms are better with moving shoulder.  Symptoms are worse with: lying in bed supine, reaching.  Other evaluation and/or treatments so far consists of: Ibuprofen and Rest.  Recent imaging completed: No recent imaging completed.  Prior History of related problems: history of intermittent shoulder pain over the past several years.  Previous steroid injection in  provided good relief.    Second complaint of acute right posterior lateral hip pain over the past ~ 3 - 4 days.  Pain is worse with lying on right side and stretching right hip.  No current treatment completed.  No prior imaging or history of similar right hip pain in the past.    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  He did see me in  for the left shoulder and pain improved with HEP.  Previous right shoulder pain with significant RTC pathology, ultimately improved with biceps tendon injection and HEP.  No bruising or swelling after the fall, has not use medication but has tried some basic ROM and stretching which was helpful.    Review of Systems  Musculoskeletal: as above  Remainder of review of systems is  negative including constitutional, CV, pulmonary, GI, Skin and Neurologic except as noted in HPI or medical history.    No past medical history on file.  Past Surgical History:   Procedure Laterality Date     ORTHOPEDIC SURGERY      left arm     No family history on file.    Objective    General: healthy, alert and in no distress    HEENT: no scleral icterus or conjunctival erythema   Skin: no suspicious lesions or rash. No jaundice.   CV: regular rhythm by palpation, 2+ distal pulses, no pedal edema    Resp: normal respiratory effort without conversational dyspnea   Psych: normal mood and affect    Gait: nonantalgic, appropriate coordination and balance   Neuro: normal light touch sensory exam of the extremities. Motor strength as noted below     Left Shoulder exam    ROM:        Mildly limited terminal active and passive ROM with flexion, extension, abduction, internal and external rotation.    Tender:        subacromial space mild       Mild lateral deltoid       posterior shoulder minimal    Non Tender:       remainder of shoulder       sternoclavicular joint       acromioclavicular joint    Strength:        abduction 5-/5       internal rotation 5/5       external rotation 5/5       adduction 5/5    Impingement testing:        neg (-) Neer       Mild pain Benitez       Mild pain empty can       neg (-) crossover       Mild pain crank    Stability testing:       neg (-) anterior glide       neg (-) sulcus sign    Skin:       no visible deformities       well perfused       capillary refill brisk    Sensation:        normal sensation over shoulder and upper extremity     Radiology  XR Shoulder Left G/E 3 Views    Narrative    XR SHOULDER LEFT G/E 3 VIEWS 1/11/2022 2:46 PM     HISTORY: Pain in joint of left shoulder    COMPARISON: None.      Impression    IMPRESSION: Osteopenia. No fractures are identified. Minimal  hypertrophic change in the glenohumeral joint. Normal glenohumeral  alignment. The  acromioclavicular joint is unremarkable.     NOEL PANDEY MD         SYSTEM ID:  [id number]       Assessment:  1. Acute pain of left shoulder    2. Rotator cuff tendinitis, left        Plan:  Discussed the assessment with the patient.  Follow up: 3 weeks based on clinical progress  Reviewed activity modification strategies to avoid pain, reviewed low impact exercises for hip  PT options reviewed for both shoulder/RTC sx  Reviewed option for diagnostic and hopefully therapeutic CSI to the subacromial bursa if pain is worsening or not improving  Reviewed the relative risks and potential benefits/goals of CSI  HEP provided, activity modification strategies reviewed  Very low suspicion for full-thickness RTC pathology based on exam today  Consider advanced imaging in the future if pain worsens or does not improve  Topical voltaren gel option reviewed  XR images independently visualized and reviewed with patient today in clinic  We discussed modified progressive pain-free activity as tolerated  Home handouts provided and supportive care reviewed  All questions were answered today  Contact us with additional questions or concerns  Signs and sx of concern reviewed      Noel Hinson DO, CAMAYA  Sports Medicine Physician  [facility]    Time spent in chart review, one-on-one evaluation, discussion with patient regarding: nature of problem, clinical course, prior treatments, therapeutic options, shared-decision making, potential procedures and referrals, and charting related to the visit: 32 minutes.  If applicable, time does not include time spent performing any procedure.      Disclaimer: This note consists of symbols derived from keyboarding, dictation and/or voice recognition software. As a result, there may be errors in the script that have gone undetected. Please consider this when interpreting information found in this chart.      Again, thank you for allowing me to participate in the  care of your patient.        Sincerely,        Noel Hinson, DO    Electronically signed